# Patient Record
Sex: FEMALE | Race: WHITE | NOT HISPANIC OR LATINO | Employment: UNEMPLOYED | URBAN - METROPOLITAN AREA
[De-identification: names, ages, dates, MRNs, and addresses within clinical notes are randomized per-mention and may not be internally consistent; named-entity substitution may affect disease eponyms.]

---

## 2019-07-23 ENCOUNTER — TELEPHONE (OUTPATIENT)
Dept: PHYSICAL MEDICINE AND REHAB | Facility: CLINIC | Age: 54
End: 2019-07-23

## 2019-07-23 NOTE — TELEPHONE ENCOUNTER
Informed patient we haven't received an order for the EMG.  She states she will call them to send it over again.  Fax number has been verified.

## 2019-07-23 NOTE — TELEPHONE ENCOUNTER
----- Message from Dain Witt sent at 7/23/2019 12:00 PM CDT -----  Type: Needs Medical Advice    Who Called:  Patient  Symptoms (please be specific):  Numbness in hand, arm and shoulder on right side  How long has patient had these symptoms:      Best Call Back Number: [phone]  Additional Information: Patient states that she would like a callback regarding scheduling a nerve conduction test.    Patient states that she has no insurance but is currently with [facility] financial assistance.  [facility] has faxed the orders.

## 2019-07-24 DIAGNOSIS — G62.9 NEUROPATHY: Primary | ICD-10-CM

## 2019-08-20 ENCOUNTER — PROCEDURE VISIT (OUTPATIENT)
Dept: PHYSICAL MEDICINE AND REHAB | Facility: CLINIC | Age: 54
End: 2019-08-20

## 2019-08-20 DIAGNOSIS — G62.9 NEUROPATHY: ICD-10-CM

## 2019-08-20 PROCEDURE — 95886 MUSC TEST DONE W/N TEST COMP: CPT | Mod: PBBFAC,PN | Performed by: PHYSICAL MEDICINE & REHABILITATION

## 2019-08-20 PROCEDURE — 95886 MUSC TEST DONE W/N TEST COMP: CPT | Mod: 26,S$PBB,, | Performed by: PHYSICAL MEDICINE & REHABILITATION

## 2019-08-20 PROCEDURE — 95908 PR NERVE CONDUCTION STUDY; 3-4 STUDIES: ICD-10-PCS | Mod: 26,S$PBB,, | Performed by: PHYSICAL MEDICINE & REHABILITATION

## 2019-08-20 PROCEDURE — 95908 NRV CNDJ TST 3-4 STUDIES: CPT | Mod: PBBFAC,PN | Performed by: PHYSICAL MEDICINE & REHABILITATION

## 2019-08-20 PROCEDURE — 95908 NRV CNDJ TST 3-4 STUDIES: CPT | Mod: 26,S$PBB,, | Performed by: PHYSICAL MEDICINE & REHABILITATION

## 2019-08-20 PROCEDURE — 95886 PR EMG COMPLETE, W/ NERVE CONDUCTION STUDIES, 5+ MUSCLES: ICD-10-PCS | Mod: 26,S$PBB,, | Performed by: PHYSICAL MEDICINE & REHABILITATION

## 2019-08-20 NOTE — PROCEDURES
Procedures   [facility]  Fariha Mitchell D.O.  Physical Medicine and Rehab  Phone: [phone]  Fax: [phone]    Neurography & Electromyography Report              Patient: Amanda Wood   Patient ID: [id number]   Sex:     Date of Birth:     Age:     Notes:     Last visit date: 8/20/2019             Visit date and time: 8/20/2019 07:29   Patient Age on Visit Date:     Referring Physician:     Diagnoses:        Right hand numbness       Sensory NCS      Nerve / Sites Rec. Site Onset Lat Peak Lat Ref. NP Amp Ref. PP Amp Ref. Segments Distance Velocity     ms ms ms µV µV µV µV  cm m/s   R Median - Digit II (Antidromic)      Wrist Dig II 3.70 4.95 ?3.40 6.3 ?15.0 7.8 ?20.0 Wrist - Dig II 13 35   R Ulnar - Digit V (Antidromic)      Wrist Dig V 2.24 2.92 ?3.10 16.8 ?10.0 31.1 ?15.0 Wrist - Dig V 11 49           Motor NCS      Nerve / Sites Muscle Latency Ref. Amplitude Ref. Amp % Duration Segments Distance Lat Diff Velocity Ref.     ms ms mV mV % ms  cm ms m/s m/s   R Median - APB      Wrist APB 5.31 ?4.40 4.9 ?4.0 100 7.76 Wrist - APB 7         Elbow APB 9.84  5.1  105 7.55 Elbow - Wrist 21 4.53 46 ?49   R Ulnar - ADM      Wrist ADM 2.29 ?3.60 8.5 ?5.0 100 6.98 Wrist - ADM 7         B.Elbow ADM 5.78  9.3  110 6.88 B.Elbow - Wrist 21 3.49 60 ?49      A.Elbow ADM 7.66  9.0  106 6.98 A.Elbow - B.Elbow 10 1.88 53 ?49           EMG Summary Table     Spontaneous MUAP Recruitment   Muscle IA Fib PSW Fasc H.F. Amp Dur. PPP Pattern   R. Triceps brachii N None None None None N N N N   R. Deltoid N None None None None N N N N   R. Biceps brachii N None None None None N N N N   R. Extensor carpi radialis brevis N None None None None N N N N   R. First dorsal interosseous N None None None None N N N N   R. Cervical paraspinals (mid) N None None None None N N N N   R. Cervical paraspinals (low) N None None None None N N N N           Summary    The motor conduction test was performed on 2 nerve(s). The results were  normal in 1 nerve(s): R Ulnar - ADM. Results outside the specified normal range were found in 1 nerve(s), as follows:   In the R Median - APB study  o the take off latency result was increased for Wrist stimulation  o the take off velocity result was reduced for Elbow - Wrist segment    The sensory conduction test was performed on 2 nerve(s). The results were normal in 1 nerve(s): R Ulnar - Digit V (Antidromic). Results outside the specified normal range were found in 1 nerve(s), as follows:   In the R Median - Digit II (Antidromic) study  o the peak latency result was increased for Wrist stimulation  o the peak amplitude result was reduced for Wrist stimulation    The needle EMG study was normal in all 7 tested muscles: R. Triceps brachii, R. Deltoid, R. Biceps brachii, R. Extensor carpi radialis brevis, R. First dorsal interosseous, R. Cervical paraspinals (mid), R. Cervical paraspinals (low).              Conclusion:     Moderate to severe right carpal tunnel syndrome          ____________________________  Fariha Mitchell D.O.

## 2019-09-09 ENCOUNTER — TELEPHONE (OUTPATIENT)
Dept: ORTHOPEDICS | Facility: CLINIC | Age: 54
End: 2019-09-09

## 2019-09-09 NOTE — TELEPHONE ENCOUNTER
Called patient to schedule referred appointment from LC Lamb at [facility] with Dr. Simmons for treatment of chronic right carpal tunnel syndrome. No answer at phone number [phone] and left voicemail for patient to call office to schedule an appointment with Dr. Simmons.

## 2019-09-10 ENCOUNTER — TELEPHONE (OUTPATIENT)
Dept: ORTHOPEDICS | Facility: CLINIC | Age: 54
End: 2019-09-10

## 2019-09-10 NOTE — TELEPHONE ENCOUNTER
----- Message from Navya Cancino sent at 9/10/2019  1:53 PM CDT -----  Contact: patient  Type: Needs Medical Advice    Who Called:  Patient   Symptoms (please be specific):  na  How long has patient had these symptoms:  cuong  Pharmacy name and phone #:  [facility]  Best Call Back Number: [phone]  Additional Information: Patient is in need of apt. For carpel tunnel syndrome.  Please call to advise and schedule. thanks!

## 2019-09-10 NOTE — TELEPHONE ENCOUNTER
Returned the patient's call to schedule an appointment for bilateral carpal tunnel. The patient was referred by LC Trejo at [facility]. The patient is scheduled to see us in October at the [facility]. She verbalized time, date, and location of appointment.

## 2019-10-01 ENCOUNTER — TELEPHONE (OUTPATIENT)
Dept: ORTHOPEDICS | Facility: CLINIC | Age: 54
End: 2019-10-01

## 2019-10-01 DIAGNOSIS — M25.532 BILATERAL WRIST PAIN: Primary | ICD-10-CM

## 2019-10-01 DIAGNOSIS — M25.531 BILATERAL WRIST PAIN: Primary | ICD-10-CM

## 2019-10-09 ENCOUNTER — HOSPITAL ENCOUNTER (OUTPATIENT)
Dept: RADIOLOGY | Facility: HOSPITAL | Age: 54
Discharge: HOME | End: 2019-10-09
Attending: ORTHOPAEDIC SURGERY

## 2019-10-09 ENCOUNTER — OFFICE VISIT (OUTPATIENT)
Dept: ORTHOPEDICS | Facility: CLINIC | Age: 54
End: 2019-10-09

## 2019-10-09 VITALS
HEIGHT: 63 IN | HEART RATE: 86 BPM | RESPIRATION RATE: 18 BRPM | BODY MASS INDEX: 23.92 KG/M2 | WEIGHT: 135 LBS | DIASTOLIC BLOOD PRESSURE: 80 MMHG | SYSTOLIC BLOOD PRESSURE: 116 MMHG

## 2019-10-09 DIAGNOSIS — G56.03 BILATERAL CARPAL TUNNEL SYNDROME: ICD-10-CM

## 2019-10-09 DIAGNOSIS — Z01.818 PRE-OP EXAM: Primary | ICD-10-CM

## 2019-10-09 DIAGNOSIS — M18.0 ARTHRITIS OF CARPOMETACARPAL (CMC) JOINT OF BOTH THUMBS: Primary | ICD-10-CM

## 2019-10-09 DIAGNOSIS — G56.21 CUBITAL TUNNEL SYNDROME ON RIGHT: ICD-10-CM

## 2019-10-09 DIAGNOSIS — M25.531 BILATERAL WRIST PAIN: ICD-10-CM

## 2019-10-09 DIAGNOSIS — G56.01 CARPAL TUNNEL SYNDROME OF RIGHT WRIST: Primary | ICD-10-CM

## 2019-10-09 DIAGNOSIS — M25.532 BILATERAL WRIST PAIN: ICD-10-CM

## 2019-10-09 DIAGNOSIS — G56.03 CARPAL TUNNEL SYNDROME, BILATERAL: ICD-10-CM

## 2019-10-09 DIAGNOSIS — G56.23 CUBITAL TUNNEL SYNDROME OF BOTH UPPER EXTREMITIES: ICD-10-CM

## 2019-10-09 PROCEDURE — 99213 OFFICE O/P EST LOW 20 MIN: CPT | Mod: PBBFAC,25,PN | Performed by: ORTHOPAEDIC SURGERY

## 2019-10-09 PROCEDURE — 73110 XR WRIST COMPLETE 3 VIEWS BILATERAL: ICD-10-PCS | Mod: 26,50,, | Performed by: RADIOLOGY

## 2019-10-09 PROCEDURE — 99204 OFFICE O/P NEW MOD 45 MIN: CPT | Mod: 57,S$PBB,, | Performed by: ORTHOPAEDIC SURGERY

## 2019-10-09 PROCEDURE — 99999 PR PBB SHADOW E&M-EST. PATIENT-LVL III: CPT | Mod: PBBFAC,,, | Performed by: ORTHOPAEDIC SURGERY

## 2019-10-09 PROCEDURE — 99204 PR OFFICE/OUTPT VISIT, NEW, LEVL IV, 45-59 MIN: ICD-10-PCS | Mod: 57,S$PBB,, | Performed by: ORTHOPAEDIC SURGERY

## 2019-10-09 PROCEDURE — 99999 PR PBB SHADOW E&M-EST. PATIENT-LVL III: ICD-10-PCS | Mod: PBBFAC,,, | Performed by: ORTHOPAEDIC SURGERY

## 2019-10-09 PROCEDURE — 73110 X-RAY EXAM OF WRIST: CPT | Mod: 26,50,, | Performed by: RADIOLOGY

## 2019-10-09 PROCEDURE — 73110 X-RAY EXAM OF WRIST: CPT | Mod: 50,TC,PN

## 2019-10-09 RX ORDER — MUPIROCIN 20 MG/G
OINTMENT TOPICAL
Status: CANCELLED | OUTPATIENT
Start: 2019-10-09

## 2019-10-09 RX ORDER — SODIUM CHLORIDE 9 MG/ML
INJECTION, SOLUTION INTRAVENOUS CONTINUOUS
Status: CANCELLED | OUTPATIENT
Start: 2019-10-09

## 2019-10-09 RX ORDER — LEVOTHYROXINE SODIUM 137 UG/1
TABLET ORAL
COMMUNITY
Start: 2019-09-30

## 2019-10-09 NOTE — LETTER
October 9, 2019      Rosa Faria, [address]  [place] [address]           [facility]  [address] [place] [address], [address]  [place] [address]  Phone: [phone]  Fax: [phone]          Patient: Amanda Wood   MR Number: [id number]   YOB: 1965   Date of Visit: 10/9/2019       Dear Dr. Rosa Faria:    Thank you for referring Amanda Wood to me for evaluation. Attached you will find relevant portions of my assessment and plan of care.    If you have questions, please do not hesitate to call me. I look forward to following Amanda Wood along with you.    Sincerely,    Kiet Simmons, DO    Enclosure  CC:  No Recipients    If you would like to receive this communication electronically, please contact [email or web address] or [phone] to request more information on [employer] access.    For providers and/or their staff who would like to refer a patient to [facility], please contact us through our one-stop-shop provider referral line, [facility] , at [phone].    If you feel you have received this communication in error or would no longer like to receive these types of communications, please e-mail [email or web address]

## 2019-10-09 NOTE — H&P (VIEW-ONLY)
"Chief Complaint: Pain of the Right Hand and Pain of the Left Hand    HPI:  Ms. Wood is a 54-year-old right-hand-dominant lady who presented today for evaluation of numbness and tingling in both her hands with her right hand most affected and only mild symptoms of her left.  She stated her symptoms have been going on for approximately 8-9 months and they awaken her at night.  She stated, I can't write 2 paragraphs without symptoms." Based to grooming increases her symptoms while nothing seems to improve them. She has been dropping items.  All the fingers of her right hand her affected and primarily ring and small finger on the left hand.  She has taken NSAIDs and worn a brace without help.  She has not done physical therapy nor had injections.    Past Medical History:   Diagnosis Date    Hypoglycemia      *  *  *  *  *  *  * Thyroid disease  Seasonal allergies  Depression  Anxiety  Headaches  Seizures  PTSD  History of suicide attempt      Past Surgical History:   Procedure Laterality Date    KNEE SURGERY patella repair left      LAPAROSCOPIC CHOLECYSTECTOMY      MOLE REMOVAL      THYROIDECTOMY Bilateral     TONSILLECTOMY      FLEXOR TENDON REPAIR LEFT WRIST after suicide attempt      PRK BILATERAL EYES         Review of patient's allergies indicates:  No Known Allergies    Social History     Occupational History    Currently unemployed   Tobacco Use    Smoking status: Current Every Day Smoker 1 pack per day for 43 years    Smokeless tobacco: Never Used   Substance and Sexual Activity    Alcohol use: Yes    Drug use: Smokes marijuana    Sexual activity: Not Currently      Family History   Problem Relation Age of Onset    Cancer Mother     Parkinsonism Father     Heart attack Father     Irritable bowel syndrome Sister     Parkinsonism Paternal Uncle        Previous Hospitalizations:  Suicide attempt, patella reconstruction, kidney infection.    ROS:   Review of Systems   Constitution: Negative for " chills and fever.   HENT: Negative for congestion.    Eyes: Negative for blurred vision.   Cardiovascular: Negative for chest pain.   Respiratory: Negative for cough.    Endocrine: Negative for polydipsia.   Hematologic/Lymphatic: Negative for adenopathy.   Skin: Negative for flushing, itching and skin cancer.   Musculoskeletal: Negative for gout.   Gastrointestinal: Negative for constipation, diarrhea and heartburn.   Genitourinary: Negative for nocturia.   Neurological: Positive for headaches and seizures.   Psychiatric/Behavioral: Positive for depression and substance abuse. The patient is nervous/anxious.    Allergic/Immunologic: Positive for environmental allergies.           Objective:      Physical Exam:   General: AAOx3.  No acute distress  HEENT: Normocephalic, PEARLA EOMI, poor dentition  Neck: Supple, No JVD  Chest: Symetric, equal excursion on inspiration  Abdomen: Soft NTND  Vascular:  Pulses intact and equal bilaterally.  Capillary refill less than 3 seconds and equal bilaterally  Neurologic:  Pinprick and soft touch intact and equal bilaterally. Tinel's positive both wrists and both elbows.  Elbow flexion test positive bilaterally.  Phalen's test positive bilaterally. Spurling's negative.  Integment:  No ecchymosis, no errythema.  Multiple tattoos. cicatrix volar aspect left wrist  Extremity:  Wrist:  Pronation/supination equal bilaterally 85/85 degrees. Dorsiflexion/volar flexion equal bilaterally 80/75 degrees. Nontender in the anatomic snuffbox bilaterally. Nontender at the 1st dorsal compartment bilaterally.  Finkelstein's negative bilaterally. No swelling at the 1st dorsal compartment bilaterally. Nontender at the scapholunate interval bilaterally. Brizuela's test negative bilaterally. Nontender at the DRUJ/TFCC bilaterally.  Nontender with forced ulnar deviation bilaterally. Positive thenar and hypothenar atrophy both hands left greater than right.  Wartenberg sign negative bilaterally. Strength  equal with  both hands.  Durkan's test positive both wrists right greater than left.  Grind test negative both hands.                       Elbow:  Pronation/supination equal bilaterally 85/85 degrees. Extension/flexion equal bilaterally 0/128 degrees. Radial/ulna stressing equal bilaterally with endpoint.  Nontender over the medial or lateral epicondyles.  Nontender over the radial head.  No crepitus with motion radial head.  Nontender over the olecranon insertion of the triceps.  Triceps palpable throughout full distribution.                        C-spine:  Forward flexion/backward flexion 70/70 degrees. Right/left rotation 70/70 degrees. Right/left side bending 45/45 degrees. No increased symptoms with neck motion.  Relatively nontender with neck motion.  Nontender with and C-spine palpation.  Radiography:  Personally reviewed x-rays of both hands completed on 10/09/2019 showed 1st CMC arthritis bilateral hands.  Electrodiagnostic studies:  Nerve conduction study completed on the right upper extremity was consistent with moderate to severe right carpal tunnel syndrome.      Assessment:       Impression:      1. Bilateral carpal tunnel syndrome    2.  3. Clinical cubital tunnel syndrome of both upper extremities   First CMC arthritis both hands.         Plan:       1.  Discussed physical examination and radiographic findings with the patient. Amanda understands that she has carpal tunnel and cubital tunnel syndrome both of her upper extremities.  She could either continue with conservative management or proceed with surgery. She stated that she has failed NSAIDs and bracing and cannot afford physical therapy would prefer to proceed with surgery as she needs to try to get back to work.  Since her right upper extremity is most affected she would prefer to proceed with a right upper extremity 1st.  2.  Possible complications of surgery to include bleeding, infection, scarring, nerve/blood vessel/tendon damage,  need for further surgery, failed surgery, failure to improve, possible persistent pain, possible recurrence, possible failure to improve, and possible stiffness were discussed with the patient.  She was permitted to ask questions and all concerns were addressed to her satisfaction.  3.  Consent for endoscopic versus open right carpal tunnel release and ulnar nerve decompression right elbow.  4.  Tentatively schedule surgery for 10/15/2019.  5.  All postoperative meds will be given on the date of surgery.  6.  Home exercises to include vitamin-E massages to the carpal canal which shown discussed with the patient.  7.  Continue with cock-up wrist splint as previously prescribed.  8.  Continue with NSAIDs as tolerated allowed by PCM.  9.  [facility] portal was discussed with the patient and information was given.  The patient was encouraged to use the portal for future encounters.  10.  Follow up approximately 10-12 days postoperatively.

## 2019-10-09 NOTE — PROGRESS NOTES
"  Subjective:      Patient ID: Amanda Wood is a 54 y.o. female.    Chief Complaint: Pain of the Right Hand and Pain of the Left Hand    Referring Provider: Rosa Faria Np  [address] [place] [address] [place] [address]    HPI:  Ms. Wood is a 54-year-old right-hand-dominant lady who presented today for evaluation of numbness and tingling in both her hands with her right hand most affected and only mild symptoms of her left.  She stated her symptoms have been going on for approximately 8-9 months and they awaken her at night.  She stated, I can't write 2 paragraphs without symptoms." Based to grooming increases her symptoms while nothing seems to improve them. She has been dropping items.  All the fingers of her right hand her affected and primarily ring and small finger on the left hand.  She has taken NSAIDs and worn a brace without help.  She has not done physical therapy nor had injections.    Past Medical History:   Diagnosis Date    Hypoglycemia      *  *  *  *  *  *  * Thyroid disease  Seasonal allergies  Depression  Anxiety  Headaches  Seizures  PTSD  History of suicide attempt      Past Surgical History:   Procedure Laterality Date    KNEE SURGERY patella repair left      LAPAROSCOPIC CHOLECYSTECTOMY      MOLE REMOVAL      THYROIDECTOMY Bilateral     TONSILLECTOMY      FLEXOR TENDON REPAIR LEFT WRIST after suicide attempt      PRK BILATERAL EYES         Review of patient's allergies indicates:  No Known Allergies    Social History     Occupational History    Currently unemployed   Tobacco Use    Smoking status: Current Every Day Smoker 1 pack per day for 43 years    Smokeless tobacco: Never Used   Substance and Sexual Activity    Alcohol use: Yes    Drug use: Smokes marijuana    Sexual activity: Not Currently      Family History   Problem Relation Age of Onset    Cancer Mother     Parkinsonism Father     Heart attack Father     Irritable bowel syndrome Sister     " Parkinsonism Paternal Uncle        Previous Hospitalizations:  Suicide attempt, patella reconstruction, kidney infection.    ROS:   Review of Systems   Constitution: Negative for chills and fever.   HENT: Negative for congestion.    Eyes: Negative for blurred vision.   Cardiovascular: Negative for chest pain.   Respiratory: Negative for cough.    Endocrine: Negative for polydipsia.   Hematologic/Lymphatic: Negative for adenopathy.   Skin: Negative for flushing, itching and skin cancer.   Musculoskeletal: Negative for gout.   Gastrointestinal: Negative for constipation, diarrhea and heartburn.   Genitourinary: Negative for nocturia.   Neurological: Positive for headaches and seizures.   Psychiatric/Behavioral: Positive for depression and substance abuse. The patient is nervous/anxious.    Allergic/Immunologic: Positive for environmental allergies.           Objective:      Physical Exam:   General: AAOx3.  No acute distress  HEENT: Normocephalic, PEARLA EOMI, poor dentition  Neck: Supple, No JVD  Chest: Symetric, equal excursion on inspiration  Abdomen: Soft NTND  Vascular:  Pulses intact and equal bilaterally.  Capillary refill less than 3 seconds and equal bilaterally  Neurologic:  Pinprick and soft touch intact and equal bilaterally. Tinel's positive both wrists and both elbows.  Elbow flexion test positive bilaterally.  Phalen's test positive bilaterally. Spurling's negative.  Integment:  No ecchymosis, no errythema.  Multiple tattoos. cicatrix volar aspect left wrist  Extremity:  Wrist:  Pronation/supination equal bilaterally 85/85 degrees. Dorsiflexion/volar flexion equal bilaterally 80/75 degrees. Nontender in the anatomic snuffbox bilaterally. Nontender at the 1st dorsal compartment bilaterally.  Finkelstein's negative bilaterally. No swelling at the 1st dorsal compartment bilaterally. Nontender at the scapholunate interval bilaterally. Brizuela's test negative bilaterally. Nontender at the DRUJ/TFCC  bilaterally.  Nontender with forced ulnar deviation bilaterally. Positive thenar and hypothenar atrophy both hands left greater than right.  Wartenberg sign negative bilaterally. Strength equal with  both hands.  Durkan's test positive both wrists right greater than left.  Grind test negative both hands.                       Elbow:  Pronation/supination equal bilaterally 85/85 degrees. Extension/flexion equal bilaterally 0/128 degrees. Radial/ulna stressing equal bilaterally with endpoint.  Nontender over the medial or lateral epicondyles.  Nontender over the radial head.  No crepitus with motion radial head.  Nontender over the olecranon insertion of the triceps.  Triceps palpable throughout full distribution.                        C-spine:  Forward flexion/backward flexion 70/70 degrees. Right/left rotation 70/70 degrees. Right/left side bending 45/45 degrees. No increased symptoms with neck motion.  Relatively nontender with neck motion.  Nontender with and C-spine palpation.  Radiography:  Personally reviewed x-rays of both hands completed on 10/09/2019 showed 1st CMC arthritis bilateral hands.  Electrodiagnostic studies:  Nerve conduction study completed on the right upper extremity was consistent with moderate to severe right carpal tunnel syndrome.      Assessment:       Impression:      1. Bilateral carpal tunnel syndrome    2.  3. Clinical cubital tunnel syndrome of both upper extremities   First CMC arthritis both hands.         Plan:       1.  Discussed physical examination and radiographic findings with the patient. Amanda understands that she has carpal tunnel and cubital tunnel syndrome both of her upper extremities.  She could either continue with conservative management or proceed with surgery. She stated that she has failed NSAIDs and bracing and cannot afford physical therapy would prefer to proceed with surgery as she needs to try to get back to work.  Since her right upper extremity is most  affected she would prefer to proceed with a right upper extremity 1st.  2.  Possible complications of surgery to include bleeding, infection, scarring, nerve/blood vessel/tendon damage, need for further surgery, failed surgery, failure to improve, possible persistent pain, possible recurrence, possible failure to improve, and possible stiffness were discussed with the patient.  She was permitted to ask questions and all concerns were addressed to her satisfaction.  3.  Consent for endoscopic versus open right carpal tunnel release and ulnar nerve decompression right elbow.  4.  Tentatively schedule surgery for 10/15/2019.  5.  All postoperative meds will be given on the date of surgery.  6.  Home exercises to include vitamin-E massages to the carpal canal which shown discussed with the patient.  7.  Continue with cock-up wrist splint as previously prescribed.  8.  Continue with NSAIDs as tolerated allowed by PCM.  9.  [facility] portal was discussed with the patient and information was given.  The patient was encouraged to use the portal for future encounters.  10.  Follow up approximately 10-12 days postoperatively.

## 2019-10-11 ENCOUNTER — HOSPITAL ENCOUNTER (OUTPATIENT)
Dept: PREADMISSION TESTING | Facility: HOSPITAL | Age: 54
Discharge: HOME | End: 2019-10-11
Attending: ORTHOPAEDIC SURGERY

## 2019-10-11 ENCOUNTER — ANESTHESIA EVENT (OUTPATIENT)
Dept: SURGERY | Facility: HOSPITAL | Age: 54
End: 2019-10-11

## 2019-10-11 VITALS — BODY MASS INDEX: 23.04 KG/M2 | HEIGHT: 63 IN | WEIGHT: 130 LBS

## 2019-10-11 PROCEDURE — 99900103 DSU ONLY-NO CHARGE-INITIAL HR (STAT)

## 2019-10-11 NOTE — ANESTHESIA PREPROCEDURE EVALUATION
10/11/2019  Amanda Wood is a 54 y.o., female.    Pre-op Assessment    I have reviewed the Patient Summary Reports.    I have reviewed the Nursing Notes.   I have reviewed the Medications.     Review of Systems  Anesthesia Hx:  No problems with previous Anesthesia  Neg history of prior surgery. Denies Family Hx of Anesthesia complications.   Denies Personal Hx of Anesthesia complications.   Social:  Smoker    Hematology/Oncology:  Hematology Normal   Oncology Normal     EENT/Dental:EENT/Dental Normal   Cardiovascular:  Cardiovascular Normal     Pulmonary:  Pulmonary Normal    Renal/:  Renal/ Normal     Hepatic/GI:  Hepatic/GI Normal    Musculoskeletal:  Musculoskeletal Normal    Neurological:   Neuromuscular Disease,    Endocrine:   Hypothyroidism    Dermatological:  Skin Normal    Psych:  Psychiatric Normal           Physical Exam   Airway/Jaw/Neck:  Airway Findings: Mouth Opening: Normal Tongue: Normal  General Airway Assessment: Adult  Mallampati: II  TM Distance: 4 - 6 cm       Chest/Lungs:  Chest/Lungs Findings: Clear to auscultation     Heart/Vascular:  Heart Findings: Rate: Normal  Rhythm: Regular Rhythm             Anesthesia Plan  Type of Anesthesia, risks & benefits discussed:  Anesthesia Type:  general  Patient's Preference:   Intra-op Monitoring Plan: standard ASA monitors  Intra-op Monitoring Plan Comments:   Post Op Pain Control Plan:   Post Op Pain Control Plan Comments:   Induction:   IV  Beta Blocker:  Patient is not currently on a Beta-Blocker (No further documentation required).       Informed Consent: Patient understands risks and agrees with Anesthesia plan.  Questions answered. Anesthesia consent signed with patient.  ASA Score: 2     Day of Surgery Review of History & Physical:    H&P update referred to the provider.

## 2019-10-11 NOTE — DISCHARGE INSTRUCTIONS
PRE-OP INSTRUCTIONS                                   1. Someone will call you with the time to arrive the day before your surgery.  2. Check in at the Outpatient Registration desk the day of you surgery.  3. Go to the surgery waiting room after checking in.  4. Do not eat or drink anything after midnight the night before your surgery.  5. Have a ride home after your procedure.  6. Wear clothing big enough to fit over a bulky post-op dressing.  7. Hibiclens as instructed.  8. Do not bring any valuables to the hospital with you.  9. Remove contact lenses, retainers, dentures, partials and ALL jewelry prior to procedure.  10. You need someone to stay with you 24 hours after your procedure/anesthesia.  11. You may not drive or operate heavy machinery 24 hours after you have had anesthesia.  12. Surgery dept. phone number [phone].       HIBICLENS INSTRUCTIONS     You will take two showers with this soap. The first shower should be taken the night before your surgery/procedure and the second shower the morning of surgery/procedure.   Do not shave the area of your body where your surgery will be performed. Any new cut, abrasion or rash on your surgical site will need to be evaluated and may cause a delay in your procedure.   Turn water off before applying the hibiclens soap to prevent rinsing it off too soon. Apply the soap to your entire body from the jaw down, using a clean washcloth or your hands. Do not use hibiclens near your eyes, ears, nose or mouth.   Wash thoroughly for three minutes, paying special attention to the area where your surgery will be performed. Do not scrub your skin too hard. Do not wash with your regular soap after using the hibiclens. Turn the water back on and rinse your body well.   Pat yourself dry with a fresh, clean, soft towel after each shower. Put on clean clothes or pajamas. Use freshly laundered bed linens for the first night.   Do not apply  any lotions, perfumes or powders after use.

## 2019-10-15 ENCOUNTER — ANESTHESIA (OUTPATIENT)
Dept: SURGERY | Facility: HOSPITAL | Age: 54
End: 2019-10-15

## 2019-10-15 ENCOUNTER — HOSPITAL ENCOUNTER (OUTPATIENT)
Facility: HOSPITAL | Age: 54
Discharge: HOME | End: 2019-10-15
Attending: ORTHOPAEDIC SURGERY | Admitting: ORTHOPAEDIC SURGERY

## 2019-10-15 VITALS
HEIGHT: 63 IN | SYSTOLIC BLOOD PRESSURE: 126 MMHG | BODY MASS INDEX: 23.92 KG/M2 | WEIGHT: 135 LBS | RESPIRATION RATE: 17 BRPM | DIASTOLIC BLOOD PRESSURE: 82 MMHG | OXYGEN SATURATION: 95 % | TEMPERATURE: 98 F | HEART RATE: 89 BPM

## 2019-10-15 DIAGNOSIS — G56.21 CUBITAL TUNNEL SYNDROME ON RIGHT: ICD-10-CM

## 2019-10-15 DIAGNOSIS — G56.01 CARPAL TUNNEL SYNDROME OF RIGHT WRIST: Primary | ICD-10-CM

## 2019-10-15 PROCEDURE — 36000707: Performed by: ORTHOPAEDIC SURGERY

## 2019-10-15 PROCEDURE — 64718 PR REVISE ULNAR NERVE AT ELBOW: ICD-10-PCS | Mod: RT,,, | Performed by: ORTHOPAEDIC SURGERY

## 2019-10-15 PROCEDURE — S0028 INJECTION, FAMOTIDINE, 20 MG: HCPCS

## 2019-10-15 PROCEDURE — 63600175 PHARM REV CODE 636 W HCPCS: Performed by: NURSE ANESTHETIST, CERTIFIED REGISTERED

## 2019-10-15 PROCEDURE — 29848 WRIST ENDOSCOPY/SURGERY: CPT | Mod: 51,RT,, | Performed by: ORTHOPAEDIC SURGERY

## 2019-10-15 PROCEDURE — 27201423 OPTIME MED/SURG SUP & DEVICES STERILE SUPPLY: Performed by: ORTHOPAEDIC SURGERY

## 2019-10-15 PROCEDURE — 37000009 HC ANESTHESIA EA ADD 15 MINS: Performed by: ORTHOPAEDIC SURGERY

## 2019-10-15 PROCEDURE — 37000008 HC ANESTHESIA 1ST 15 MINUTES: Performed by: ORTHOPAEDIC SURGERY

## 2019-10-15 PROCEDURE — 25000003 PHARM REV CODE 250

## 2019-10-15 PROCEDURE — 25000003 PHARM REV CODE 250: Performed by: ANESTHESIOLOGY

## 2019-10-15 PROCEDURE — 71000033 HC RECOVERY, INTIAL HOUR: Performed by: ORTHOPAEDIC SURGERY

## 2019-10-15 PROCEDURE — 25000003 PHARM REV CODE 250: Performed by: ORTHOPAEDIC SURGERY

## 2019-10-15 PROCEDURE — 71000015 HC POSTOP RECOV 1ST HR: Performed by: ORTHOPAEDIC SURGERY

## 2019-10-15 PROCEDURE — 36000706: Performed by: ORTHOPAEDIC SURGERY

## 2019-10-15 PROCEDURE — D9220A PRA ANESTHESIA: Mod: ,,, | Performed by: ANESTHESIOLOGY

## 2019-10-15 PROCEDURE — 29848 PR WRIST ARTHROSCOP,RELEASE XVERS LIG: ICD-10-PCS | Mod: 51,RT,, | Performed by: ORTHOPAEDIC SURGERY

## 2019-10-15 PROCEDURE — 63600175 PHARM REV CODE 636 W HCPCS

## 2019-10-15 PROCEDURE — D9220A PRA ANESTHESIA: ICD-10-PCS | Mod: ,,, | Performed by: ANESTHESIOLOGY

## 2019-10-15 PROCEDURE — 25000003 PHARM REV CODE 250: Performed by: NURSE ANESTHETIST, CERTIFIED REGISTERED

## 2019-10-15 PROCEDURE — 63600175 PHARM REV CODE 636 W HCPCS: Performed by: ANESTHESIOLOGY

## 2019-10-15 PROCEDURE — 64718 REVISE ULNAR NERVE AT ELBOW: CPT | Mod: RT,,, | Performed by: ORTHOPAEDIC SURGERY

## 2019-10-15 RX ORDER — ONDANSETRON 2 MG/ML
INJECTION INTRAMUSCULAR; INTRAVENOUS
Status: DISCONTINUED | OUTPATIENT
Start: 2019-10-15 | End: 2019-10-15

## 2019-10-15 RX ORDER — SODIUM CHLORIDE, SODIUM LACTATE, POTASSIUM CHLORIDE, CALCIUM CHLORIDE 600; 310; 30; 20 MG/100ML; MG/100ML; MG/100ML; MG/100ML
125 INJECTION, SOLUTION INTRAVENOUS CONTINUOUS
Status: DISCONTINUED | OUTPATIENT
Start: 2019-10-15 | End: 2019-10-15 | Stop reason: HOSPADM

## 2019-10-15 RX ORDER — SODIUM CHLORIDE, SODIUM LACTATE, POTASSIUM CHLORIDE, CALCIUM CHLORIDE 600; 310; 30; 20 MG/100ML; MG/100ML; MG/100ML; MG/100ML
INJECTION, SOLUTION INTRAVENOUS CONTINUOUS
Status: DISCONTINUED | OUTPATIENT
Start: 2019-10-15 | End: 2019-10-15 | Stop reason: HOSPADM

## 2019-10-15 RX ORDER — LIDOCAINE HYDROCHLORIDE 10 MG/ML
1 INJECTION, SOLUTION EPIDURAL; INFILTRATION; INTRACAUDAL; PERINEURAL ONCE
Status: DISCONTINUED | OUTPATIENT
Start: 2019-10-15 | End: 2019-10-15 | Stop reason: HOSPADM

## 2019-10-15 RX ORDER — SODIUM CHLORIDE, SODIUM LACTATE, POTASSIUM CHLORIDE, CALCIUM CHLORIDE 600; 310; 30; 20 MG/100ML; MG/100ML; MG/100ML; MG/100ML
INJECTION, SOLUTION INTRAVENOUS CONTINUOUS PRN
Status: DISCONTINUED | OUTPATIENT
Start: 2019-10-15 | End: 2019-10-15

## 2019-10-15 RX ORDER — MEPERIDINE HYDROCHLORIDE 50 MG/ML
INJECTION INTRAMUSCULAR; INTRAVENOUS; SUBCUTANEOUS
Status: DISCONTINUED | OUTPATIENT
Start: 2019-10-15 | End: 2019-10-15

## 2019-10-15 RX ORDER — FAMOTIDINE 10 MG/ML
INJECTION INTRAVENOUS
Status: COMPLETED
Start: 2019-10-15 | End: 2019-10-15

## 2019-10-15 RX ORDER — HYDROCODONE BITARTRATE AND ACETAMINOPHEN 5; 325 MG/1; MG/1
1 TABLET ORAL ONCE
Status: COMPLETED | OUTPATIENT
Start: 2019-10-15 | End: 2019-10-15

## 2019-10-15 RX ORDER — HYDROCODONE BITARTRATE AND ACETAMINOPHEN 5; 325 MG/1; MG/1
TABLET ORAL
Status: COMPLETED
Start: 2019-10-15 | End: 2019-10-15

## 2019-10-15 RX ORDER — ONDANSETRON 2 MG/ML
4 INJECTION INTRAMUSCULAR; INTRAVENOUS DAILY PRN
Status: DISCONTINUED | OUTPATIENT
Start: 2019-10-15 | End: 2019-10-15 | Stop reason: HOSPADM

## 2019-10-15 RX ORDER — MORPHINE SULFATE 4 MG/ML
2 INJECTION, SOLUTION INTRAMUSCULAR; INTRAVENOUS EVERY 5 MIN PRN
Status: DISCONTINUED | OUTPATIENT
Start: 2019-10-15 | End: 2019-10-15 | Stop reason: HOSPADM

## 2019-10-15 RX ORDER — DIPHENHYDRAMINE HYDROCHLORIDE 50 MG/ML
12.5 INJECTION INTRAMUSCULAR; INTRAVENOUS
Status: DISCONTINUED | OUTPATIENT
Start: 2019-10-15 | End: 2019-10-15 | Stop reason: HOSPADM

## 2019-10-15 RX ORDER — PROPOFOL 10 MG/ML
VIAL (ML) INTRAVENOUS
Status: DISCONTINUED | OUTPATIENT
Start: 2019-10-15 | End: 2019-10-15

## 2019-10-15 RX ORDER — GLYCOPYRROLATE 0.2 MG/ML
INJECTION INTRAMUSCULAR; INTRAVENOUS
Status: DISCONTINUED | OUTPATIENT
Start: 2019-10-15 | End: 2019-10-15

## 2019-10-15 RX ORDER — MORPHINE SULFATE 4 MG/ML
INJECTION, SOLUTION INTRAMUSCULAR; INTRAVENOUS
Status: COMPLETED
Start: 2019-10-15 | End: 2019-10-15

## 2019-10-15 RX ORDER — MIDAZOLAM HYDROCHLORIDE 1 MG/ML
INJECTION, SOLUTION INTRAMUSCULAR; INTRAVENOUS
Status: DISCONTINUED | OUTPATIENT
Start: 2019-10-15 | End: 2019-10-15

## 2019-10-15 RX ORDER — FAMOTIDINE 10 MG/ML
20 INJECTION INTRAVENOUS 2 TIMES DAILY
Status: DISCONTINUED | OUTPATIENT
Start: 2019-10-15 | End: 2019-10-15 | Stop reason: HOSPADM

## 2019-10-15 RX ORDER — SODIUM CHLORIDE 9 MG/ML
INJECTION, SOLUTION INTRAVENOUS CONTINUOUS
Status: DISCONTINUED | OUTPATIENT
Start: 2019-10-15 | End: 2019-10-15 | Stop reason: HOSPADM

## 2019-10-15 RX ORDER — MUPIROCIN 20 MG/G
OINTMENT TOPICAL
Status: DISCONTINUED | OUTPATIENT
Start: 2019-10-15 | End: 2019-10-15 | Stop reason: HOSPADM

## 2019-10-15 RX ADMIN — MORPHINE SULFATE 2 MG: 4 INJECTION, SOLUTION INTRAMUSCULAR; INTRAVENOUS at 01:10

## 2019-10-15 RX ADMIN — HYDROCODONE BITARTRATE AND ACETAMINOPHEN 1 TABLET: 5; 325 TABLET ORAL at 01:10

## 2019-10-15 RX ADMIN — PROPOFOL 200 MG: 10 INJECTION, EMULSION INTRAVENOUS at 11:10

## 2019-10-15 RX ADMIN — MEPERIDINE HYDROCHLORIDE 50 MG: 50 INJECTION INTRAMUSCULAR; INTRAVENOUS; SUBCUTANEOUS at 11:10

## 2019-10-15 RX ADMIN — FAMOTIDINE 20 MG: 10 INJECTION, SOLUTION INTRAVENOUS at 10:10

## 2019-10-15 RX ADMIN — MUPIROCIN: 20 OINTMENT TOPICAL at 10:10

## 2019-10-15 RX ADMIN — ONDANSETRON 4 MG: 2 INJECTION INTRAMUSCULAR; INTRAVENOUS at 12:10

## 2019-10-15 RX ADMIN — FAMOTIDINE 20 MG: 10 INJECTION INTRAVENOUS at 10:10

## 2019-10-15 RX ADMIN — SODIUM CHLORIDE, POTASSIUM CHLORIDE, SODIUM LACTATE AND CALCIUM CHLORIDE: 600; 310; 30; 20 INJECTION, SOLUTION INTRAVENOUS at 11:10

## 2019-10-15 RX ADMIN — GLYCOPYRROLATE 0.4 MG: 0.2 INJECTION INTRAMUSCULAR; INTRAVENOUS at 12:10

## 2019-10-15 RX ADMIN — ONDANSETRON 4 MG: 2 INJECTION INTRAMUSCULAR; INTRAVENOUS at 02:10

## 2019-10-15 RX ADMIN — MIDAZOLAM HYDROCHLORIDE 2 MG: 1 INJECTION, SOLUTION INTRAMUSCULAR; INTRAVENOUS at 11:10

## 2019-10-15 NOTE — TRANSFER OF CARE
"Anesthesia Transfer of Care Note    Patient: Amanda Wood    Procedure(s) Performed: Procedure(s) (LRB):  RELEASE, CARPAL TUNNEL (Right)  RELEASE, CARPAL TUNNEL, ENDOSCOPIC (Right)  DECOMPRESSION, NERVE, ULNAR (Right)  RELEASE, CUBITAL TUNNEL (Right)    Patient location: PACU    Anesthesia Type: general    Transport from OR: Transported from OR on room air with adequate spontaneous ventilation    Post pain: adequate analgesia    Post assessment: no apparent anesthetic complications and tolerated procedure well    Post vital signs: stable    Level of consciousness: awake, alert and oriented    Nausea/Vomiting: no nausea/vomiting    Complications: none    Transfer of care protocol was followed      Last vitals:   Visit Vitals  /74   Pulse 73   Temp 36.9 °C (98.4 °F) (Oral)   Resp 11   Ht 5' 3" (1.6 m)   Wt 61.2 kg (135 lb)   SpO2 100%   Breastfeeding? No   BMI 23.91 kg/m²     "

## 2019-10-15 NOTE — DISCHARGE INSTRUCTIONS
Discharge Instructions for Carpal Tunnel Release  You had a carpal tunnel release procedure to help relieve the symptoms of carpal tunnel syndrome. In carpal tunnel syndrome, a nerve in the wrist is compressed and irritated. This causes numbness and pain in the fingers and hand. Carpal tunnel release relieves the compression of the nerve. Here are instructions that will help you care for your arm and wrist when you are at home.  Home care  · Avoid gripping objects tightly or lifting with your affected arm.  · Wear your bandage, splint, or cast as directed by your doctor.  · Always keep the dressing, splint, or cast dry and clean.  · When showering, cover your hand and  wrist with plastic and use tape or rubberbands to keep the dressing, splint, or cast dry. Shower as necessary.    · Use an ice pack or bag of frozen peas -- or something similar -- wrapped in a thin towel on your wrist to reduce swelling for the first 48 hours. Leave the ice pack on for 20 minutes; then take it off for 20 minutes. Repeat as needed.  · Keep your arm elevated above your heart for 24 to 48 hours after surgery.  · Do the exercises you learned in the hospital, or as instructed by your doctor.  · Take pain medicine as directed.  · Dont drive until your doctor says its OK. Never drive while you are taking opioid pain medicine.  · Ask your doctor when you can return to work. If your job requires heavy lifting, you may not be able to begin working again for several weeks.  Follow-up care  Make a follow-up appointment as directed by your doctor.     When to seek medical care  Call 911 right away if you have any of the following:  · Chest pain  · Shortness of breath  Otherwise, call your doctor immediately if you have any of the following:  · A splint, cast, or dressing that has gotten wet  · Increased bleeding or drainage from the incision (cut)  · Opening of the incision  · Fever above 100.4°F (38.9°C) taken by mouth, or shaking  "chills  · Any new numbness in the fingers or thumb  · Blue hand or fingers  · Increased pain with or without activity  · Increased redness, tenderness, or swelling of the incision         Sling    A sling is designed to support your arm in a position of rest. It is used for injuries of the hand, forearm, upper arm, and shoulder.  A shoulder that is immobilized too long can become stiff and lose range of motion. Follow up with your doctor as advised and do not use the sling longer than directed.    Home use:  · Leave the sling in place as long as directed by your doctor. Unless told otherwise, you may remove it when bathing, dressing, and when you go to sleep.  · If approved by your health care provider, you can do gentle "pendulum exercises." To do these:  ¨ Remove your sling.  ¨ Stand or sit with your arm vertical and close to your side.  ¨ Relax your shoulder muscles and gently swing the arm forward and back, side to side, and in small circles.  ¨ Do this for about 5 minutes once or twice a day.  There should be only minimal pain with this exercise. If you experience more than minimal discomfort, stop and call your health care provider.  · The sling is adjustable. If it becomes loose, adjust it so that your forearm is horizontal (level with the ground). Your hand should be level with the elbow.        "

## 2019-10-15 NOTE — ANESTHESIA POSTPROCEDURE EVALUATION
Anesthesia Post Evaluation    Patient: Amanda Wood    Procedure(s) Performed: Procedure(s) (LRB):  RELEASE, CARPAL TUNNEL (Right)  RELEASE, CARPAL TUNNEL, ENDOSCOPIC (Right)  DECOMPRESSION, NERVE, ULNAR (Right)  RELEASE, CUBITAL TUNNEL (Right)    Final Anesthesia Type: general  Patient location during evaluation: PACU  Patient participation: Yes- Able to Participate  Level of consciousness: awake and alert  Post-procedure vital signs: reviewed and stable  Pain management: adequate  Airway patency: patent  PONV status at discharge: No PONV  Anesthetic complications: no      Cardiovascular status: blood pressure returned to baseline  Respiratory status: unassisted  Hydration status: euvolemic  Follow-up not needed.          Vitals Value Taken Time   /82 10/15/2019  2:17 PM   Temp 36.8 °C (98.3 °F) 10/15/2019  1:18 PM   Pulse 80 10/15/2019  2:27 PM   Resp 19 10/15/2019  2:00 PM   SpO2 93 % 10/15/2019  2:27 PM   Vitals shown include unvalidated device data.      Event Time     Out of Recovery 14:00:00          Pain/Karel Score: Pain Rating Prior to Med Admin: 5 (10/15/2019  1:55 PM)  Pain Rating Post Med Admin: 4 (10/15/2019  1:56 PM)  Karel Score: 10 (10/15/2019  2:13 PM)

## 2019-10-15 NOTE — DISCHARGE SUMMARY
Ms. Wood was admitted through same-day surgery was brought to the operating room where an ulnar nerve decompression and carpal tunnel release was completed.  For full account of surgery please see the operative report.  Postoperatively the patient was transferred from the operating room to the recovery room and when alert awake and oriented was discharged home in stable condition with instructions to follow up in 10-12 days.

## 2019-10-15 NOTE — INTERVAL H&P NOTE
The patient has been examined and the H&P has been reviewed:  Operative exteremity signed at 11:45 hrs.  H&P updated at 11:45 hrts.  Pt reeady to roll to OR at 11:s.    I concur with the findings and no changes have occurred since H&P was written.    Anesthesia/Surgery risks, benefits and alternative options discussed and understood by patient/family.          Active Hospital Problems    Diagnosis  POA    Carpal tunnel syndrome of right wrist [G56.01]  Yes      Resolved Hospital Problems   No resolved problems to display.

## 2019-10-15 NOTE — PLAN OF CARE
Hob at 60 degrees, pt c/o mild nausea. meds given as ordered, water and crackers given, cont to monitor

## 2019-10-15 NOTE — OP NOTE
"[facility]  Orthopedic Surgery    10/15/2019    Amanda Wood  [id number]      PREOPERATIVE DIAGNOSIS:   1.  Carpal tunnel syndrome of right wrist [G56.01]  2.  Cubital tunnel syndrome on right [G56.21]    POSTOPERATIVE DIAGNOSIS:    1.  Right carpal tunnel syndrome.  2.  Right cubital tunnel syndrome.    PROCEDURE:  1.  Endoscopic right carpal tunnel release.  2.  Ulnar nerve decompression right elbow.  3.  Long-arm plaster splint, right upper extremity.    SURGEON: Kiet Simmons D.O.    ASSISTANT: N/A.    ANESTHESIA:  General.    BLOOD LOSS:  Less than 20 cc.    TOURNIQUET:  16 min.    DRAINS:  None.    PATHOLOGY:  None.    COMPLICATION:  None.    INDICATIONS FOR PROCEDURE:   Ms. Wood is a 54-year-old right-hand-dominant lady who presented today for evaluation of numbness and tingling in both her hands with her right hand most affected and only mild symptoms of her left. Her symptoms have been going on for approximately 8-9 months and they awaken her at night.  She stated, I can't write 2 paragraphs without symptoms."  She has been dropping items.  All the fingers of her right hand are affected and primarily ring and small finger on the left hand.  She has taken NSAIDs and worn a brace without help.  She elected to proceed with surgery after she failed conservative care and complications to include bleeding, infection, scarring, nerve/blood vessel/tendon damage, recurrence, need for further surgery, failed surgery, failure to improve, stiffness, skin slough, and possible amputation were discussed.  She   signed a consent.    PROCEDURE IN DETAIL:  The patient was brought to the operating room was transferred to the operating bed where all bony prominences were well padded. General anesthesia was then administered by the Anesthesiology Department.  After general anesthesia was administered a tourniquet was applied to the upper part of the patient's operative extremity.  The patient's arm was " "then prepped with chlorhexidine solution and draped in the normal sterile fashion.  After prepping and draping bony and soft tissue landmarks were palpated and incision site the patient's elbow and wrist were drawn on her extremity.  The patient's arm was then elevated, exsanguinated, and tourniquet was inflated.       Sharp incision was then made at the medial elbow incision site with a #15 blade followed by dissection to the level of the ulnar nerve.  The ulnar nerve was identified and freed from investing tissues while protecting vital structures.  After release was completed the incision was packed with a moist Ray-Trinity.       Attention was then placed on the wrist where a volar incision was made with a #15 blade at the ulnar aspect of the palmaris tendon and dissection was taken to the level of the brachioradial fascia.  The brachioradial fascia was identified and a longitudinal rent was placed in the brachioradial fascia with a #15 blade. A more ulnar incision, approximately 0.8 cm from the initial 1 was made in the brachioradial fascia and then the 2 incisions were connected making a window.  The proximal brachioradial fascia was then freed and released with a pair tenotomy scissors.  Attention was then placed on the carpal canal where a spatula was placed in the wrist incision and the tenosynovium was freed from the undersurface of the transcarpal ligament while feeling the washboard effect."  The spatula was removed and then progressive hamate Finders were placed followed by the co equal. The coequal was then removed and the camera/knife assembly was placed in the wrist and advanced to the distal extent of the transcarpal ligament. Distal extent of the transcarpal ligament was identified and then the knife blade assembly was raised and a half release was completed.  The knife blade assembly was retracted and the camera knife assembly was directed distally again and a full release was completed.  After a " full release was completed neither leaf of the transcarpal ligament could be seen in the operative camera field at the same time. The camera knife assembly was then removed from the patient's wrist and the window was freed with a pair tenotomy scissors.  The release was then checked by placing a Ragnell within the incision ensuring a complete release of the transcarpal ligament. The Ragnell was then removed from the patient's wrist and the incision was copiously irrigated.  The patient's wrist was then elevated and the tourniquet was released. After several minutes full hemostasis at the wrist was ensured and the incision was then closed with nylon suture in a horizontal mattress type of fashion.         Attention was then placed at the elbow incision where the previously moistened Ray-Trinity was placed.  The Ray-Trinity was removed and full hemostasis was ensured.  The incision was then closed in layers with Vicryl suture with final plastic closure. The elbow incision was then dressed with Mastisol, Steri-Strips, Adaptic, sterile gauze and sterile cast padding.  The wrist incision was also dressed with Adaptic, sterile gauze and sterile cast padding.  The patient had final dressings placed with sterile cast padding followed by posterior mold plaster splint and an Ace wrap.       The patient was then awakened by anesthesia and was transferred from the operating room to the recovery room in stable condition.  The patient tolerated the procedure well without complications.

## 2019-10-17 ENCOUNTER — TELEPHONE (OUTPATIENT)
Dept: ORTHOPEDICS | Facility: CLINIC | Age: 54
End: 2019-10-17

## 2019-10-17 NOTE — TELEPHONE ENCOUNTER
Called patient to ask how she is doing after surgery with Dr. Simmons and to verify her post op appointment.     No answer at phone number [phone] and left voicemail for patient to call office to speak to nurse. Voicemail also contained post op appointment information.

## 2019-10-22 ENCOUNTER — OFFICE VISIT (OUTPATIENT)
Dept: ORTHOPEDICS | Facility: CLINIC | Age: 54
End: 2019-10-22

## 2019-10-22 VITALS
WEIGHT: 134.94 LBS | HEIGHT: 63 IN | DIASTOLIC BLOOD PRESSURE: 72 MMHG | SYSTOLIC BLOOD PRESSURE: 98 MMHG | HEART RATE: 98 BPM | BODY MASS INDEX: 23.91 KG/M2 | RESPIRATION RATE: 18 BRPM

## 2019-10-22 DIAGNOSIS — Z98.890 S/P CARPAL TUNNEL RELEASE: Primary | ICD-10-CM

## 2019-10-22 DIAGNOSIS — Z48.02 ENCOUNTER FOR REMOVAL OF SUTURES: Primary | ICD-10-CM

## 2019-10-22 DIAGNOSIS — Z51.89 AFTER CARE: Primary | ICD-10-CM

## 2019-10-22 PROCEDURE — 99024 POSTOP FOLLOW-UP VISIT: CPT | Mod: ,,, | Performed by: ORTHOPAEDIC SURGERY

## 2019-10-22 PROCEDURE — 99999 PR PBB SHADOW E&M-EST. PATIENT-LVL III: CPT | Mod: PBBFAC,,, | Performed by: ORTHOPAEDIC SURGERY

## 2019-10-22 PROCEDURE — 99213 OFFICE O/P EST LOW 20 MIN: CPT | Mod: PBBFAC,PN | Performed by: ORTHOPAEDIC SURGERY

## 2019-10-22 PROCEDURE — 99024 PR POST-OP FOLLOW-UP VISIT: ICD-10-PCS | Mod: ,,, | Performed by: ORTHOPAEDIC SURGERY

## 2019-10-22 PROCEDURE — 99999 PR PBB SHADOW E&M-EST. PATIENT-LVL III: ICD-10-PCS | Mod: PBBFAC,,, | Performed by: ORTHOPAEDIC SURGERY

## 2019-10-22 NOTE — PROGRESS NOTES
Subjective:      Patient ID: Amanda Wood is a 54 y.o. female.    Chief Complaint: Post-op Evaluation of the Right Wrist      HPI:  Ms. Wood returned today for 1st postop visit on endoscopic right carpal tunnel release and an ulnar nerve decompression of her right elbow.  She states she is doing well and is relatively pain free. She denied numbness and tingling and stated her feeling is returning into her fingers.  Her date of surgery 10/15/2019.    ROS:  New diagnosis/surgery/prescriptions since last office visit on 10/09/2019:  Endoscopic right carpal tunnel release and ulnar nerve decompression right elbow.      Objective:      Physical Exam:   General: AAOx3.  No acute distress  Vascular:  Pulses intact and equal bilaterally.  Capillary refill less than 3 seconds and equal bilaterally  Neurologic:  Pinprick and soft touch intact and equal bilaterally. Tinel's resolved right hand.  Phalen's resolved right upper extremity  Integment:  Mild palmar ecchymosis right hand no errythema.  Incisions well approximated and healing well.  Extremity:  Hand:  Pronation/supination equal bilaterally.  Dorsiflexion/volar flexion equal bilaterally. Mild tenderness with palpation palmar aspect proximal right hand.  No swelling. Good finger motion without pain. Good elbow motion without pain. Able to adduct thumb.  Radiography:  No x-rays done today.      Assessment:       Impression:   1.  Endoscopic right carpal tunnel release.  2.  Ulnar nerve decompression, right elbow.      Plan:       1.  Discussed physical examination with the patient. Amanda understands that she had an endoscopic carpal tunnel release of her right hand and an ulnar nerve decompression of her right elbow.  She understands she appears to be doing well but she will have some pain in her hand for up to 3 months due to pillar pain.  2.  Remove sutures and place Steri-Strips.  3.  Refer to occupational therapy to work on hand and elbow motion.    4.  Home  exercises to include hand motion exercises and dish washing were shown discussed with the patient.  5.  Any pain can be treated with over-the-counter medications dosed per box instructions.  6.  May begin to use hands as tolerated.  7.  [facility] portal was discussed with the patient and information was given.  The patient was encouraged to use the portal for future encounters.  8.  Follow up in 1 month for re-evaluation.

## 2019-10-30 ENCOUNTER — PATIENT MESSAGE (OUTPATIENT)
Dept: ORTHOPEDICS | Facility: CLINIC | Age: 54
End: 2019-10-30

## 2019-11-04 ENCOUNTER — CLINICAL SUPPORT (OUTPATIENT)
Dept: REHABILITATION | Facility: HOSPITAL | Age: 54
End: 2019-11-04
Attending: ORTHOPAEDIC SURGERY

## 2019-11-04 DIAGNOSIS — Z98.890 S/P DECOMPRESSION OF ULNAR NERVE AT ELBOW: ICD-10-CM

## 2019-11-04 DIAGNOSIS — Z98.890 S/P CARPAL TUNNEL RELEASE: Primary | ICD-10-CM

## 2019-11-04 PROCEDURE — 97124 MASSAGE THERAPY: CPT

## 2019-11-04 PROCEDURE — 97110 THERAPEUTIC EXERCISES: CPT

## 2019-11-04 PROCEDURE — 97165 OT EVAL LOW COMPLEX 30 MIN: CPT
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
## 2019-11-11 ENCOUNTER — CLINICAL SUPPORT (OUTPATIENT)
Dept: REHABILITATION | Facility: HOSPITAL | Age: 54
End: 2019-11-11
Attending: ORTHOPAEDIC SURGERY

## 2019-11-11 DIAGNOSIS — Z98.890 S/P DECOMPRESSION OF ULNAR NERVE AT ELBOW: ICD-10-CM

## 2019-11-11 DIAGNOSIS — Z98.890 S/P CARPAL TUNNEL RELEASE: Primary | ICD-10-CM

## 2019-11-11 PROCEDURE — 97110 THERAPEUTIC EXERCISES: CPT

## 2019-11-11 PROCEDURE — 97035 APP MDLTY 1+ULTRASOUND EA 15: CPT

## 2019-11-11 PROCEDURE — 97124 MASSAGE THERAPY: CPT

## 2019-11-11 PROCEDURE — 97010 HOT OR COLD PACKS THERAPY: CPT

## 2019-11-11 NOTE — PROGRESS NOTES
Occupational Therapy Daily Treatment Note     Name: Amanda Green [facility] Number: [id number]    Therapy Diagnosis:   Encounter Diagnoses   Name Primary?    S/P carpal tunnel release Yes    S/P decompression of ulnar nerve at elbow      Physician: Kiet Simmons DO    Visit Date: 11/11/2019    Physician Orders: OT R UE  Medical Diagnosis: s/p R carpal tunnel and ulnar nerve decompression  Evaluation Date: 11/04/2019  Authorization Period Expiration: -  Plan of Care Certification Period: 01/24/2020  Visit #/Visits authorized: 2    Time In: 200  Time Out: 300  Total Billable Time: 60 minutes    Precautions: Standard    Subjective     Pt reports: stiffness today.  She was compliant with home exercise program.  Response to previous treatment: tolerated successfully  Functional change: some stiffness per report    Pain: 2/10  Location: right wrist    Objective     Amanda received hot pack for 15 minutes to R wrist and R elbow.    Amanda received therapeutic exercises to develop strength, ROM and flexibility including:  Light resist digi flex R hand x 25  AROM R wrist flex/ext  AROM R radial dev/pron  AROM R sup/pron    Amanda received the following manual therapy techniques: scar massage were applied to the: R elbow and R wrist  for 12 minutes.    Amanda received the following direct contact modalities after being cleared for contraindications: Ultrasound:  Amanda received ultrasound to manage pain and inflammation at 50 % duty cycle applied to the R elbow and R wrist at an intensity of  3.3 MHz and 2.0 W/cm2  for a duration of 10 minutes. Patient tolerated treatment well without adverse effects. Therapist was in attendance throughout intervention.      Home Exercises Provided and Patient Education Provided     Education provided:   - HEP    Written Home Exercises Provided: Patient instructed to cont prior HEP.  Exercises were reviewed and Amanda was able to demonstrate them prior to the end of the session.  Amanda  demonstrated good  understanding of the education provided.     See EMR under Media for exercises provided prior visit.    Assessment       Amanda is progressing well towards her goals.   Pt prognosis is Good.     Pt will continue to benefit from skilled outpatient occupational therapy to address the deficits listed in the problem list box on initial evaluation, provide pt/family education and to maximize pt's level of independence in the home and community environment.     Pt's spiritual, cultural and educational needs considered and pt agreeable to plan of care and goals.     Anticipated barriers to occupational therapy: none    Goals:   Patient will tolerate multiple modalities to R elbow and wrist to improve function and decrease pain.   Patient will increase R  strength by 10# within 4 weeks.   Patient will demonstrate independence in HEP.     Plan     Continue OT per established POC.    Heidy Gamez, OT

## 2019-11-13 ENCOUNTER — CLINICAL SUPPORT (OUTPATIENT)
Dept: REHABILITATION | Facility: HOSPITAL | Age: 54
End: 2019-11-13
Attending: ORTHOPAEDIC SURGERY

## 2019-11-13 DIAGNOSIS — Z98.890 S/P CARPAL TUNNEL RELEASE: Primary | ICD-10-CM

## 2019-11-13 DIAGNOSIS — Z98.890 S/P DECOMPRESSION OF ULNAR NERVE AT ELBOW: ICD-10-CM

## 2019-11-13 PROCEDURE — 97010 HOT OR COLD PACKS THERAPY: CPT

## 2019-11-13 PROCEDURE — 97110 THERAPEUTIC EXERCISES: CPT

## 2019-11-13 PROCEDURE — 97140 MANUAL THERAPY 1/> REGIONS: CPT

## 2019-11-13 NOTE — PROGRESS NOTES
Occupational Therapy Daily Treatment Note     Name: Amanda Green [facility] Number: [id number]    Therapy Diagnosis:   Encounter Diagnoses   Name Primary?    S/P carpal tunnel release Yes    S/P decompression of ulnar nerve at elbow      Physician: Kiet Simmons DO    Visit Date: 11/13/2019    Physician Orders: OT R UE  Medical Diagnosis: s/p R carpal tunnel and ulnar nerve decompression  Evaluation Date: 11/04/2019  Authorization Period Expiration: -  Plan of Care Certification Period: 01/24/2020  Visit #/Visits authorized: 2    Time In: 200  Time Out: 300  Total Billable Time: 60 minutes    Precautions: Standard    Subjective     Pt reports: stiffness today.  She was compliant with home exercise program.  Response to previous treatment: tolerated successfully  Functional change: some stiffness per report    Pain: 2/10  Location: right wrist    Objective     Amanda received hot pack for 15 minutes to R wrist and R elbow.    Amanda received therapeutic exercises to develop strength, ROM and flexibility including:  Light resist digi flex R hand x 25  AROM R wrist flex/ext  AROM R radial dev/pron  AROM R sup/pron  UBE x 5 mins MIN resist   Clothespin tree RUE all resist levels    Amanda received the following manual therapy techniques: scar massage were applied to the: R elbow and R wrist  for 12 minutes.      Home Exercises Provided and Patient Education Provided     Education provided:   - HEP    Written Home Exercises Provided: Patient instructed to cont prior HEP.  Exercises were reviewed and Amanda was able to demonstrate them prior to the end of the session.  Amanda demonstrated good  understanding of the education provided.     See EMR under Media for exercises provided prior visit.    Assessment       Amanda is progressing well towards her goals.   Pt prognosis is Good.     Pt will continue to benefit from skilled outpatient occupational therapy to address the deficits listed in the problem list box on  initial evaluation, provide pt/family education and to maximize pt's level of independence in the home and community environment.     Pt's spiritual, cultural and educational needs considered and pt agreeable to plan of care and goals.     Anticipated barriers to occupational therapy: none    Goals:   Patient will tolerate multiple modalities to R elbow and wrist to improve function and decrease pain.   Patient will increase R  strength by 10# within 4 weeks.   Patient will demonstrate independence in HEP.     Plan     Continue OT per established POC.    Heidy Gamez, OT

## 2019-11-19 ENCOUNTER — CLINICAL SUPPORT (OUTPATIENT)
Dept: REHABILITATION | Facility: HOSPITAL | Age: 54
End: 2019-11-19
Attending: ORTHOPAEDIC SURGERY

## 2019-11-19 DIAGNOSIS — Z98.890 S/P CARPAL TUNNEL RELEASE: Primary | ICD-10-CM

## 2019-11-19 DIAGNOSIS — Z98.890 S/P DECOMPRESSION OF ULNAR NERVE AT ELBOW: ICD-10-CM

## 2019-11-19 PROCEDURE — 97110 THERAPEUTIC EXERCISES: CPT

## 2019-11-19 PROCEDURE — 97010 HOT OR COLD PACKS THERAPY: CPT

## 2019-11-19 NOTE — PROGRESS NOTES
Occupational Therapy Daily Treatment Note     Name: Amanda Green [facility] Number: [id number]    Therapy Diagnosis:   Encounter Diagnoses   Name Primary?    S/P carpal tunnel release Yes    S/P decompression of ulnar nerve at elbow      Physician: Kiet Simmons DO    Visit Date: 11/19/2019    Physician Orders: OT R UE  Medical Diagnosis: s/p R carpal tunnel and ulnar nerve decompression  Evaluation Date: 11/04/2019  Authorization Period Expiration: -  Plan of Care Certification Period: 01/24/2020  Visit #/Visits authorized: 3    Time In: 300  Time Out: 345  Total Billable Time: 45 minutes    Precautions: Standard    Subjective     Pt reports: redness at site of ulnar decompression incision end with redness and reports that it had pus come out over the weekend.   She was compliant with home exercise program.  Response to previous treatment: tolerated successfully  Functional change: some stiffness per report    Pain: 2/10  Location: right wrist    Objective     Amanda received hot pack for 15 minutes to R wrist and R elbow.    Amanda received therapeutic exercises to develop strength, ROM and flexibility including:  Light resist digi flex R hand x 50  AROM R wrist ext using 2# dumbbell  AROM R wrist flex using 2# dumbbell  AROM R radial dev/pron using 2# dumbbell  AROM R sup/pron using 2# dumbbell   UBE x 6 mins MIN resist   Clothespin tree RUE all resist levels RUE      Home Exercises Provided and Patient Education Provided     Education provided:   - HEP    Written Home Exercises Provided: Patient instructed to cont prior HEP.  Exercises were reviewed and Amanda was able to demonstrate them prior to the end of the session.  Amanda demonstrated good  understanding of the education provided.     See EMR under Media for exercises provided prior visit.    Assessment   Site on end of incision scar is slightly red with dry appearance. Patient reports she had pus come out over the weekend. OT advised her to use  neosporin on it. Site appears to be healing and is not leaking any fluid today. She sees MD Friday.    Amanda is progressing well towards her goals.   Pt prognosis is Good.     Pt will continue to benefit from skilled outpatient occupational therapy to address the deficits listed in the problem list box on initial evaluation, provide pt/family education and to maximize pt's level of independence in the home and community environment.     Pt's spiritual, cultural and educational needs considered and pt agreeable to plan of care and goals.     Anticipated barriers to occupational therapy: none    Goals:   Patient will tolerate multiple modalities to R elbow and wrist to improve function and decrease pain.   Patient will increase R  strength by 10# within 4 weeks.   Patient will demonstrate independence in HEP.     Plan     Continue OT per established POC.    Heidy Gamez, OT

## 2019-11-22 ENCOUNTER — OFFICE VISIT (OUTPATIENT)
Dept: ORTHOPEDICS | Facility: CLINIC | Age: 54
End: 2019-11-22

## 2019-11-22 VITALS
WEIGHT: 134.94 LBS | HEIGHT: 63 IN | BODY MASS INDEX: 23.91 KG/M2 | SYSTOLIC BLOOD PRESSURE: 122 MMHG | DIASTOLIC BLOOD PRESSURE: 77 MMHG | HEART RATE: 108 BPM

## 2019-11-22 DIAGNOSIS — G56.02 LEFT CARPAL TUNNEL SYNDROME: ICD-10-CM

## 2019-11-22 DIAGNOSIS — G56.22 CUBITAL TUNNEL SYNDROME ON LEFT: ICD-10-CM

## 2019-11-22 DIAGNOSIS — Z01.818 PRE-OP TESTING: Primary | ICD-10-CM

## 2019-11-22 DIAGNOSIS — Z98.890 S/P CUBITAL TUNNEL RELEASE: ICD-10-CM

## 2019-11-22 DIAGNOSIS — M25.532 LEFT WRIST PAIN: ICD-10-CM

## 2019-11-22 DIAGNOSIS — Z98.890 S/P CARPAL TUNNEL RELEASE: ICD-10-CM

## 2019-11-22 PROCEDURE — 99213 OFFICE O/P EST LOW 20 MIN: CPT | Mod: 57,S$PBB,, | Performed by: ORTHOPAEDIC SURGERY

## 2019-11-22 PROCEDURE — 99213 PR OFFICE/OUTPT VISIT, EST, LEVL III, 20-29 MIN: ICD-10-PCS | Mod: 57,S$PBB,, | Performed by: ORTHOPAEDIC SURGERY

## 2019-11-22 PROCEDURE — 99999 PR PBB SHADOW E&M-EST. PATIENT-LVL III: CPT | Mod: PBBFAC,,, | Performed by: ORTHOPAEDIC SURGERY

## 2019-11-22 PROCEDURE — 99213 OFFICE O/P EST LOW 20 MIN: CPT | Mod: PBBFAC,PN | Performed by: ORTHOPAEDIC SURGERY

## 2019-11-22 PROCEDURE — 99999 PR PBB SHADOW E&M-EST. PATIENT-LVL III: ICD-10-PCS | Mod: PBBFAC,,, | Performed by: ORTHOPAEDIC SURGERY

## 2019-11-22 NOTE — H&P
Chief Complaint: Pain of the Right Hand and Pain of the Left Hand     HPI:  Ms. Wood is a 54-year-old right-hand-dominant lady who returned today with complaints of increasing numbness and tingling in her left hand.  She had a right carpal tunnel release with ulnar nerve decompression at her elbow on 10/15/2019 and is doing extremely well. The feeling as returned to her fingers of her right hand.  She still has numbness and tingling in the fingers of her left hand.  She has been symptomatic for approximately 10 months.  The symptoms of her left hand awaken her at night.  She drops items with her left hand.  The ring and small finger on the left hand are primarily affected although she does have numbness and tingling in the other fingers.  She has taken NSAIDs and worn a brace without help.  She is currently doing physical therapy for right hand which is helping to improve her symptoms.  She does have some pillar type pain which is increased with shaking Peoples hands in her right hand.  She has had injections in her left hand without improvement.  The date of surgery of the patient's right hand/elbow was 10/15/2019, she is now approximately 6 weeks postop.          Past Medical History:   Diagnosis Date    Hypoglycemia       *  *  *  *  *  *  * Thyroid disease  Seasonal allergies  Depression  Anxiety  Headaches  Seizures  PTSD  History of suicide attempt              Past Surgical History:   Procedure Laterality Date    KNEE SURGERY patella repair left        LAPAROSCOPIC CHOLECYSTECTOMY        MOLE REMOVAL        THYROIDECTOMY Bilateral      TONSILLECTOMY        FLEXOR TENDON REPAIR LEFT WRIST after suicide attempt         *  * PRK BILATERAL EYES  ENDOSCOPIC RIGHT CARPAL TUNNEL RELEASED.  ULNAR NERVE DECOMPRESSION RIGHT ELBOW.             Review of patient's allergies indicates:  No Known Allergies     Social History           Occupational History    Currently unemployed   Tobacco Use    Smoking status:  Current Every Day Smoker 1 pack per day for 43 years    Smokeless tobacco: Never Used   Substance and Sexual Activity    Alcohol use: Yes    Drug use: Smokes marijuana    Sexual activity: Not Currently            Family History   Problem Relation Age of Onset    Cancer Mother      Parkinsonism Father      Heart attack Father      Irritable bowel syndrome Sister      Parkinsonism Paternal Uncle           Previous Hospitalizations:  Suicide attempt, patella reconstruction, kidney infection.     ROS:  No new diagnosis/surgery/prescriptions since last office visit on 10/22/2019.  Constitution: Negative for chills and fever.   HENT: Negative for congestion.    Eyes: Negative for blurred vision.   Cardiovascular: Negative for chest pain.   Respiratory: Negative for cough.    Endocrine: Negative for polydipsia.   Hematologic/Lymphatic: Negative for adenopathy.   Skin: Negative for flushing, itching and skin cancer.   Musculoskeletal: Negative for gout.   Gastrointestinal: Negative for constipation, diarrhea and heartburn.   Genitourinary: Negative for nocturia.   Neurological: Positive for headaches and seizures.   Psychiatric/Behavioral: Positive for depression and substance abuse. The patient is nervous/anxious.    Allergic/Immunologic: Positive for environmental allergies.             Objective:      Physical Exam:   General: AAOx3.  No acute distress  HEENT: Normocephalic, PEARLA EOMI, poor dentition  Neck: Supple, No JVD  Chest: Symetric, equal excursion on inspiration  Abdomen: Soft NTND  Vascular:  Pulses intact and equal bilaterally.  Capillary refill less than 3 seconds and equal bilaterally  Neurologic:  Pinprick and soft touch intact and equal bilaterally. Tinel's positive left wrist and elbow.  Elbow flexion test positive left elbow.  Phalen's test positive left upper extremity. Spurling's negative.  Integment:  No ecchymosis, no errythema.  Multiple tattoos. cicatrix volar aspect left wrist. Mild  spitting of suture right elbow.  Incisions well approximated and well healed.  Extremity:  Wrist:  Pronation/supination equal bilaterally 85/85 degrees. Dorsiflexion/volar flexion equal bilaterally 80/75 degrees. Nontender in the anatomic snuffbox bilaterally. Nontender at the 1st dorsal compartment bilaterally.  Finkelstein's negative bilaterally. No swelling at the 1st dorsal compartment bilaterally. Nontender at the scapholunate interval bilaterally. Brizuela's test negative bilaterally. Nontender at the DRUJ/TFCC bilaterally.  Nontender with forced ulnar deviation bilaterally. Positive thenar and hypothenar atrophy both hands left greater than right.  Wartenberg sign negative bilaterally. Tender with  right hand.  Durkan's test positive left hand.  Grind test negative both hands.                       Elbow:  Pronation/supination equal bilaterally 85/85 degrees. Extension/flexion equal bilaterally 0/128 degrees. Radial/ulna stressing equal bilaterally with endpoint.  Nontender over the medial or lateral epicondyles.  Nontender over the radial head.  No crepitus with motion radial head.  Nontender over the olecranon insertion of the triceps.  Triceps palpable throughout full distribution.  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  Electrodiagnostic studies:  Nerve conduction study completed on the right upper extremity was consistent with moderate to severe carpal tunnel syndrome.      Assessment:       Impression:       1. Left carpal tunnel syndrome    2.  3.  4. Clinical cubital tunnel syndrome left elbow   Endoscopic right carpal tunnel release  Ulnar nerve decompression right elbow          Plan:       1.  Discussed physical examination with the patient. She was doing well with her carpal tunnel release and ulnar nerve decompression of her right hand.  She stated that since she is doing well she would like to consider surgery on her left upper extremity.   Amanda understands that she still has carpal tunnel and cubital tunnel syndrome of the left upper extremity.  She could either continue with conservative management or proceed with surgery on her left upper extremity. She has failed NSAIDs, injections, and bracing would like to proceed with surgery on her left upper extremity since she had a good result with the right .  2.  Possible complications of surgery to include bleeding, infection, scarring, nerve/blood vessel/tendon damage, need for further surgery, failed surgery, failure to improve, possible persistent pain, possible recurrence, possible failure to improve, and possible stiffness were discussed with the patient.  She was permitted to ask questions and all concerns were addressed to her satisfaction.  3.  Consent for endoscopic versus open left carpal tunnel release and ulnar nerve decompression left elbow.  4.  Tentatively schedule surgery for 12/10/2019.  5.  All postoperative meds will be given on the date of surgery.  6.   continue with home exercises to include vitamin-E massages to the carpal canal as previously shown discussed.  7.  Continue with cock-up wrist splint as previously prescribed.  8.  Continue with NSAIDs as tolerated allowed by PCM.  9.  Remove spitting suture from right elbow.  10.  Continue with physical therapy on the right hand.  Expect she will also need to do it on her left hand postoperatively.  11. [facility] portal was discussed with the patient and information was given.  The patient was encouraged to use the portal for future encounters.  12.  Follow up approximately 10-12 days postoperatively.

## 2019-11-25 DIAGNOSIS — Z01.818 PRE-OP EXAM: Primary | ICD-10-CM

## 2019-11-25 DIAGNOSIS — G56.22 CUBITAL TUNNEL SYNDROME ON LEFT: ICD-10-CM

## 2019-11-25 DIAGNOSIS — G56.02 LEFT CARPAL TUNNEL SYNDROME: ICD-10-CM

## 2019-11-25 DIAGNOSIS — G56.00 CARPAL TUNNEL SYNDROME: ICD-10-CM

## 2019-11-25 RX ORDER — SODIUM CHLORIDE 9 MG/ML
INJECTION, SOLUTION INTRAVENOUS CONTINUOUS
Status: CANCELLED | OUTPATIENT
Start: 2019-11-25

## 2019-11-25 RX ORDER — MUPIROCIN 20 MG/G
OINTMENT TOPICAL
Status: CANCELLED | OUTPATIENT
Start: 2019-11-25

## 2019-11-26 ENCOUNTER — CLINICAL SUPPORT (OUTPATIENT)
Dept: REHABILITATION | Facility: HOSPITAL | Age: 54
End: 2019-11-26
Attending: ORTHOPAEDIC SURGERY

## 2019-11-26 DIAGNOSIS — Z98.890 S/P DECOMPRESSION OF ULNAR NERVE AT ELBOW: ICD-10-CM

## 2019-11-26 DIAGNOSIS — Z98.890 S/P CARPAL TUNNEL RELEASE: Primary | ICD-10-CM

## 2019-11-26 PROCEDURE — 97110 THERAPEUTIC EXERCISES: CPT

## 2019-11-26 PROCEDURE — 97010 HOT OR COLD PACKS THERAPY: CPT

## 2019-11-26 PROCEDURE — 97035 APP MDLTY 1+ULTRASOUND EA 15: CPT

## 2019-11-26 PROCEDURE — 97124 MASSAGE THERAPY: CPT

## 2019-11-26 NOTE — PROGRESS NOTES
Occupational Therapy Daily Treatment Note     Name: Amanda Green [facility] Number: [id number]    Therapy Diagnosis:   Encounter Diagnoses   Name Primary?    S/P carpal tunnel release Yes    S/P decompression of ulnar nerve at elbow      Physician: Kiet Simmons DO    Visit Date: 11/26/2019    Physician Orders: OT R UE  Medical Diagnosis: s/p R carpal tunnel and ulnar nerve decompression  Evaluation Date: 11/04/2019  Authorization Period Expiration: -  Plan of Care Certification Period: 01/24/2020  Visit #/Visits authorized: 5    Time In: 400  Time Out: 500  Total Billable Time: 60 minutes    Precautions: Standard    Subjective     Pt reports: stitch was removed at irritated site of decompression R elbow and she plans to undergo surgery on LUE for decompression and carpal tunnel release.   She was compliant with home exercise program.  Response to previous treatment: tolerated successfully  Functional change: some stiffness per report    Pain: 2/10  Location: right wrist    Objective     Amanda received hot pack for 15 minutes to R wrist and R elbow.    Amanda received scar massage to R elbow scar s/p decompression x 10 mins.    Amanda received therapeutic exercises to develop strength, ROM and flexibility including:  AROM R wrist ext using 2# dumbbell x 15  AROM R wrist flex using 2# dumbbell x 15  AROM R radial dev/pron using 2# dumbbell x 15  AROM R sup/pron using 2# dumbbell x 15  UBE x 5 mins MIN resist   Clothespin tree RUE all resist levels RUE  Digi extension light resist R hand x 30     Amanda received the following direct contact modalities after being cleared for contraindications: Ultrasound:  Amanda received ultrasound to manage pain and inflammation at 50 % duty cycle applied to the R elbow and R wrist at an intensity of  3.3 MHz and 2.0 W/cm2  for a duration of 10 minutes. Patient tolerated treatment well without adverse effects. Therapist was in attendance throughout intervention.      Home  Exercises Provided and Patient Education Provided     Education provided:   - HEP    Written Home Exercises Provided: Patient instructed to cont prior HEP.  Exercises were reviewed and Amanda was able to demonstrate them prior to the end of the session.  Amanda demonstrated good  understanding of the education provided.     See EMR under Media for exercises provided prior visit.    Assessment   Patient has completed 5/8 visits in this POC. Discussed she has 3 more visits and we will continue to address strengthening and modalities RUE. She will undergo surgery for LUE on 12/10/2019.    Amanda is progressing well towards her goals.   Pt prognosis is Good.     Pt will continue to benefit from skilled outpatient occupational therapy to address the deficits listed in the problem list box on initial evaluation, provide pt/family education and to maximize pt's level of independence in the home and community environment.     Pt's spiritual, cultural and educational needs considered and pt agreeable to plan of care and goals.     Anticipated barriers to occupational therapy: none    Goals:   Patient will tolerate multiple modalities to R elbow and wrist to improve function and decrease pain.   Patient will increase R  strength by 10# within 4 weeks.   Patient will demonstrate independence in HEP.     Plan     Continue OT per established POC.    Heidy Gamez, OT

## 2019-12-03 ENCOUNTER — ANESTHESIA EVENT (OUTPATIENT)
Dept: SURGERY | Facility: HOSPITAL | Age: 54
End: 2019-12-03

## 2019-12-03 ENCOUNTER — HOSPITAL ENCOUNTER (OUTPATIENT)
Dept: PREADMISSION TESTING | Facility: HOSPITAL | Age: 54
Discharge: HOME | End: 2019-12-03
Attending: ORTHOPAEDIC SURGERY

## 2019-12-03 NOTE — ANESTHESIA PREPROCEDURE EVALUATION
12/03/2019  Amanda Wood is a 54 y.o., female.    Anesthesia Evaluation    I have reviewed the Patient Summary Reports.    I have reviewed the Nursing Notes.   I have reviewed the Medications.     Review of Systems  Anesthesia Hx:  No problems with previous Anesthesia    Social:  Smoker    Hematology/Oncology:  Hematology Normal   Oncology Normal     EENT/Dental:EENT/Dental Normal   Cardiovascular:  Cardiovascular Normal     Pulmonary:  Pulmonary Normal    Renal/:  Renal/ Normal     Hepatic/GI:  Hepatic/GI Normal    Musculoskeletal:  Musculoskeletal Normal    Neurological:  Neurology Normal    Endocrine:   Hypothyroidism    Dermatological:  Skin Normal    Psych:  Psychiatric Normal           Physical Exam  General:  Well nourished    Airway/Jaw/Neck:  Airway Findings: Mouth Opening: Normal General Airway Assessment: Adult  Mallampati: II  TM Distance: 4 - 6 cm       Chest/Lungs:  Chest/Lungs Findings:    Heart/Vascular:  Heart Findings: Rate: Normal  Rhythm: Regular Rhythm        Mental Status:  Mental Status Findings:  Cooperative, Alert and Oriented         Anesthesia Plan  Type of Anesthesia, risks & benefits discussed:  Anesthesia Type:  general  Patient's Preference:   Intra-op Monitoring Plan: standard ASA monitors  Intra-op Monitoring Plan Comments:   Post Op Pain Control Plan: IV/PO Opioids PRN  Post Op Pain Control Plan Comments:   Induction:   IV  Beta Blocker:  Patient is not currently on a Beta-Blocker (No further documentation required).       Informed Consent: Patient understands risks and agrees with Anesthesia plan.  Questions answered. Anesthesia consent signed with patient.  ASA Score: 2     Day of Surgery Review of History & Physical: I have interviewed and examined the patient. I have reviewed the patient's H&P dated:            Ready For Surgery From Anesthesia Perspective.

## 2019-12-03 NOTE — PLAN OF CARE
Patient given Hibiclens to wash with the night before and morning of surgery. NPO past midnight. Have a ride home from the hospital. Instructed on what medications to take/etc.

## 2019-12-04 ENCOUNTER — CLINICAL SUPPORT (OUTPATIENT)
Dept: REHABILITATION | Facility: HOSPITAL | Age: 54
End: 2019-12-04
Attending: ORTHOPAEDIC SURGERY

## 2019-12-04 DIAGNOSIS — Z98.890 S/P CARPAL TUNNEL RELEASE: Primary | ICD-10-CM

## 2019-12-04 DIAGNOSIS — Z98.890 S/P DECOMPRESSION OF ULNAR NERVE AT ELBOW: ICD-10-CM

## 2019-12-04 PROCEDURE — 97124 MASSAGE THERAPY: CPT

## 2019-12-04 PROCEDURE — 97110 THERAPEUTIC EXERCISES: CPT

## 2019-12-04 PROCEDURE — 97010 HOT OR COLD PACKS THERAPY: CPT

## 2019-12-04 NOTE — PROGRESS NOTES
Occupational Therapy Daily Treatment Note     Name: Amanda Green [facility] Number: [id number]    Therapy Diagnosis:   Encounter Diagnoses   Name Primary?    S/P carpal tunnel release Yes    S/P decompression of ulnar nerve at elbow      Physician: Kiet Simmons DO    Visit Date: 12/4/2019    Physician Orders: OT R UE  Medical Diagnosis: s/p R carpal tunnel and ulnar nerve decompression  Evaluation Date: 11/04/2019  Authorization Period Expiration: -  Plan of Care Certification Period: 01/24/2020  Visit #/Visits authorized: 6    Time In: 400  Time Out: 500  Total Billable Time: 55 minutes    Precautions: Standard    Subjective     Pt reports: she will have surgery next week for LUE releases   She was compliant with home exercise program.  Response to previous treatment: tolerated successfully  Functional change: some stiffness per report    Pain: 2/10  Location: right wrist    Objective     Amanda received hot pack for 15 minutes to R wrist and R elbow.    Amanda received therapeutic exercises to develop strength, ROM and flexibility including:  MOD resist digi flex R hand x 50  Light resist digi extension R hand x 30   UBE x 6 mins MIN resist   Clothespin tree RUE all resist levels RUE   Power flexor x 30 5# resist BUE    Amanda received scar massage to R elbow scar s/p decompression and R wrist x 12 mins.      Home Exercises Provided and Patient Education Provided     Education provided:   - HEP    Written Home Exercises Provided: Patient instructed to cont prior HEP.  Exercises were reviewed and Amanda was able to demonstrate them prior to the end of the session.  Amanda demonstrated good  understanding of the education provided.     See EMR under Media for exercises provided prior visit.    Assessment   Complete this POC with discharge. She will undergo surgery for LUE on 12/11/2019.    Amanda is progressing well towards her goals.   Pt prognosis is Good.     Pt's spiritual, cultural and educational needs  considered and pt agreeable to plan of care and goals.     Anticipated barriers to occupational therapy: none    Goals:   Patient will tolerate multiple modalities to R elbow and wrist to improve function and decrease pain.   Patient will increase R  strength by 10# within 4 weeks.   Patient will demonstrate independence in HEP.     Plan     Continue OT per established POC.    Heidy Gamez OT       Outpatient Therapy Discharge Summary     Name: Amanda Green [facility] Number: [id number]    Therapy Diagnosis:   Encounter Diagnoses   Name Primary?    S/P carpal tunnel release Yes    S/P decompression of ulnar nerve at elbow      Physician: Kiet Simmons DO    Physician Orders: RUE s/p R carpal tunnel and ulnar nerve decompression  Medical Diagnosis: above  Evaluation Date: 11/04/2019      Date of Last visit: 12/04/2019  Total Visits Received: 6      Assessment    Goals: completed and met  Patient will tolerate multiple modalities to R elbow and wrist to improve function and decrease pain.   Patient will increase R  strength by 10# within 4 weeks.   Patient will demonstrate independence in HEP.     Discharge reason: Patient has met all of his/her goals, Patient requested discharge and Other:  Patient is doing well postop RUE and is undergoing surgery for LUE on 12/11/2019    Plan   This patient is discharged from Occupational Therapy    Heidy Gamez OT

## 2019-12-10 ENCOUNTER — ANESTHESIA (OUTPATIENT)
Dept: SURGERY | Facility: HOSPITAL | Age: 54
End: 2019-12-10

## 2019-12-10 ENCOUNTER — HOSPITAL ENCOUNTER (OUTPATIENT)
Facility: HOSPITAL | Age: 54
Discharge: HOME | End: 2019-12-10
Attending: ORTHOPAEDIC SURGERY | Admitting: ORTHOPAEDIC SURGERY

## 2019-12-10 VITALS
WEIGHT: 135 LBS | TEMPERATURE: 98 F | DIASTOLIC BLOOD PRESSURE: 88 MMHG | RESPIRATION RATE: 20 BRPM | BODY MASS INDEX: 23.92 KG/M2 | HEART RATE: 96 BPM | SYSTOLIC BLOOD PRESSURE: 116 MMHG | HEIGHT: 63 IN | OXYGEN SATURATION: 95 %

## 2019-12-10 DIAGNOSIS — Z01.818 PRE-OP EXAM: ICD-10-CM

## 2019-12-10 DIAGNOSIS — G56.02 LEFT CARPAL TUNNEL SYNDROME: ICD-10-CM

## 2019-12-10 DIAGNOSIS — G56.00 CARPAL TUNNEL SYNDROME: Primary | ICD-10-CM

## 2019-12-10 DIAGNOSIS — G56.22 CUBITAL TUNNEL SYNDROME ON LEFT: ICD-10-CM

## 2019-12-10 PROCEDURE — 25000003 PHARM REV CODE 250: Performed by: ORTHOPAEDIC SURGERY

## 2019-12-10 PROCEDURE — 71000033 HC RECOVERY, INTIAL HOUR: Performed by: ORTHOPAEDIC SURGERY

## 2019-12-10 PROCEDURE — 71000015 HC POSTOP RECOV 1ST HR: Performed by: ORTHOPAEDIC SURGERY

## 2019-12-10 PROCEDURE — D9220A PRA ANESTHESIA: Mod: ,,, | Performed by: ANESTHESIOLOGY

## 2019-12-10 PROCEDURE — 64718 PR REVISE ULNAR NERVE AT ELBOW: ICD-10-PCS | Mod: LT,,, | Performed by: ORTHOPAEDIC SURGERY

## 2019-12-10 PROCEDURE — 63600175 PHARM REV CODE 636 W HCPCS: Performed by: ANESTHESIOLOGY

## 2019-12-10 PROCEDURE — 63600175 PHARM REV CODE 636 W HCPCS: Performed by: NURSE ANESTHETIST, CERTIFIED REGISTERED

## 2019-12-10 PROCEDURE — 36000707: Performed by: ORTHOPAEDIC SURGERY

## 2019-12-10 PROCEDURE — 63600175 PHARM REV CODE 636 W HCPCS

## 2019-12-10 PROCEDURE — 64721 CARPAL TUNNEL SURGERY: CPT | Mod: 51,LT,, | Performed by: ORTHOPAEDIC SURGERY

## 2019-12-10 PROCEDURE — 37000009 HC ANESTHESIA EA ADD 15 MINS: Performed by: ORTHOPAEDIC SURGERY

## 2019-12-10 PROCEDURE — 37000008 HC ANESTHESIA 1ST 15 MINUTES: Performed by: ORTHOPAEDIC SURGERY

## 2019-12-10 PROCEDURE — 25000003 PHARM REV CODE 250: Performed by: NURSE ANESTHETIST, CERTIFIED REGISTERED

## 2019-12-10 PROCEDURE — 63600175 PHARM REV CODE 636 W HCPCS: Performed by: ORTHOPAEDIC SURGERY

## 2019-12-10 PROCEDURE — 64721 PR REVISE MEDIAN N/CARPAL TUNNEL SURG: ICD-10-PCS | Mod: 51,LT,, | Performed by: ORTHOPAEDIC SURGERY

## 2019-12-10 PROCEDURE — 64718 REVISE ULNAR NERVE AT ELBOW: CPT | Mod: LT,,, | Performed by: ORTHOPAEDIC SURGERY

## 2019-12-10 PROCEDURE — D9220A PRA ANESTHESIA: ICD-10-PCS | Mod: ,,, | Performed by: ANESTHESIOLOGY

## 2019-12-10 PROCEDURE — 36000706: Performed by: ORTHOPAEDIC SURGERY

## 2019-12-10 RX ORDER — ONDANSETRON 2 MG/ML
INJECTION INTRAMUSCULAR; INTRAVENOUS
Status: COMPLETED
Start: 2019-12-10 | End: 2019-12-10

## 2019-12-10 RX ORDER — LIDOCAINE HYDROCHLORIDE 10 MG/ML
INJECTION, SOLUTION EPIDURAL; INFILTRATION; INTRACAUDAL; PERINEURAL
Status: DISCONTINUED
Start: 2019-12-10 | End: 2019-12-10 | Stop reason: HOSPADM

## 2019-12-10 RX ORDER — LIDOCAINE HYDROCHLORIDE 10 MG/ML
1 INJECTION, SOLUTION EPIDURAL; INFILTRATION; INTRACAUDAL; PERINEURAL ONCE
Status: DISCONTINUED | OUTPATIENT
Start: 2019-12-10 | End: 2019-12-10 | Stop reason: HOSPADM

## 2019-12-10 RX ORDER — KETOROLAC TROMETHAMINE 30 MG/ML
INJECTION, SOLUTION INTRAMUSCULAR; INTRAVENOUS
Status: DISCONTINUED
Start: 2019-12-10 | End: 2019-12-10 | Stop reason: HOSPADM

## 2019-12-10 RX ORDER — MUPIROCIN 20 MG/G
OINTMENT TOPICAL
Status: DISCONTINUED | OUTPATIENT
Start: 2019-12-10 | End: 2019-12-10 | Stop reason: HOSPADM

## 2019-12-10 RX ORDER — SODIUM CHLORIDE 9 MG/ML
INJECTION, SOLUTION INTRAVENOUS CONTINUOUS
Status: DISCONTINUED | OUTPATIENT
Start: 2019-12-10 | End: 2019-12-10 | Stop reason: HOSPADM

## 2019-12-10 RX ORDER — ONDANSETRON 2 MG/ML
4 INJECTION INTRAMUSCULAR; INTRAVENOUS DAILY PRN
Status: COMPLETED | OUTPATIENT
Start: 2019-12-10 | End: 2019-12-10

## 2019-12-10 RX ORDER — MIDAZOLAM HYDROCHLORIDE 1 MG/ML
INJECTION, SOLUTION INTRAMUSCULAR; INTRAVENOUS
Status: DISCONTINUED | OUTPATIENT
Start: 2019-12-10 | End: 2019-12-10

## 2019-12-10 RX ORDER — MEPERIDINE HYDROCHLORIDE 50 MG/ML
INJECTION INTRAMUSCULAR; INTRAVENOUS; SUBCUTANEOUS
Status: DISCONTINUED | OUTPATIENT
Start: 2019-12-10 | End: 2019-12-10

## 2019-12-10 RX ORDER — SODIUM CHLORIDE, SODIUM LACTATE, POTASSIUM CHLORIDE, CALCIUM CHLORIDE 600; 310; 30; 20 MG/100ML; MG/100ML; MG/100ML; MG/100ML
INJECTION, SOLUTION INTRAVENOUS CONTINUOUS
Status: DISCONTINUED | OUTPATIENT
Start: 2019-12-10 | End: 2019-12-10 | Stop reason: HOSPADM

## 2019-12-10 RX ORDER — KETOROLAC TROMETHAMINE 30 MG/ML
15 INJECTION, SOLUTION INTRAMUSCULAR; INTRAVENOUS ONCE
Status: COMPLETED | OUTPATIENT
Start: 2019-12-10 | End: 2019-12-10

## 2019-12-10 RX ORDER — GLYCOPYRROLATE 0.2 MG/ML
INJECTION INTRAMUSCULAR; INTRAVENOUS
Status: DISCONTINUED | OUTPATIENT
Start: 2019-12-10 | End: 2019-12-10

## 2019-12-10 RX ORDER — SODIUM CHLORIDE, SODIUM LACTATE, POTASSIUM CHLORIDE, CALCIUM CHLORIDE 600; 310; 30; 20 MG/100ML; MG/100ML; MG/100ML; MG/100ML
125 INJECTION, SOLUTION INTRAVENOUS CONTINUOUS
Status: DISCONTINUED | OUTPATIENT
Start: 2019-12-10 | End: 2019-12-10 | Stop reason: HOSPADM

## 2019-12-10 RX ORDER — ONDANSETRON 2 MG/ML
INJECTION INTRAMUSCULAR; INTRAVENOUS
Status: DISCONTINUED | OUTPATIENT
Start: 2019-12-10 | End: 2019-12-10

## 2019-12-10 RX ORDER — PROPOFOL 10 MG/ML
VIAL (ML) INTRAVENOUS
Status: DISCONTINUED | OUTPATIENT
Start: 2019-12-10 | End: 2019-12-10

## 2019-12-10 RX ORDER — MORPHINE SULFATE 4 MG/ML
2 INJECTION, SOLUTION INTRAMUSCULAR; INTRAVENOUS EVERY 5 MIN PRN
Status: DISCONTINUED | OUTPATIENT
Start: 2019-12-10 | End: 2019-12-10 | Stop reason: HOSPADM

## 2019-12-10 RX ORDER — OXYCODONE AND ACETAMINOPHEN 5; 325 MG/1; MG/1
1 TABLET ORAL
Status: DISCONTINUED | OUTPATIENT
Start: 2019-12-10 | End: 2019-12-10 | Stop reason: HOSPADM

## 2019-12-10 RX ADMIN — PROPOFOL 150 MG: 10 INJECTION, EMULSION INTRAVENOUS at 10:12

## 2019-12-10 RX ADMIN — GLYCOPYRROLATE 0.4 MG: 0.2 INJECTION INTRAMUSCULAR; INTRAVENOUS at 11:12

## 2019-12-10 RX ADMIN — PROPOFOL 50 MG: 10 INJECTION, EMULSION INTRAVENOUS at 10:12

## 2019-12-10 RX ADMIN — ONDANSETRON 4 MG: 2 INJECTION INTRAMUSCULAR; INTRAVENOUS at 12:12

## 2019-12-10 RX ADMIN — SODIUM CHLORIDE: 0.9 INJECTION, SOLUTION INTRAVENOUS at 10:12

## 2019-12-10 RX ADMIN — MIDAZOLAM HYDROCHLORIDE 1 MG: 1 INJECTION, SOLUTION INTRAMUSCULAR; INTRAVENOUS at 10:12

## 2019-12-10 RX ADMIN — ONDANSETRON 4 MG: 2 INJECTION INTRAMUSCULAR; INTRAVENOUS at 11:12

## 2019-12-10 RX ADMIN — KETOROLAC TROMETHAMINE 15 MG: 30 INJECTION, SOLUTION INTRAMUSCULAR at 12:12

## 2019-12-10 RX ADMIN — MUPIROCIN: 20 OINTMENT TOPICAL at 10:12

## 2019-12-10 RX ADMIN — MORPHINE SULFATE 2 MG: 4 INJECTION, SOLUTION INTRAMUSCULAR; INTRAVENOUS at 12:12

## 2019-12-10 RX ADMIN — MEPERIDINE HYDROCHLORIDE 25 MG: 50 INJECTION INTRAMUSCULAR; INTRAVENOUS; SUBCUTANEOUS at 11:12

## 2019-12-10 NOTE — OP NOTE
[facility]  Orthopedic Surgery    12/10/2019    Amanda Wood  [id number]      PREOPERATIVE DIAGNOSIS:   1.  Left carpal tunnel syndrome [G56.02]  2.  Cubital tunnel syndrome on left [G56.22]    POSTOPERATIVE DIAGNOSIS:    1.  Carpal tunnel syndrome, left wrist.  2.  Cubital tunnel syndrome, left elbow.    PROCEDURE:  1.  Endoscopic carpal tunnel release, left wrist.  2.  Ulnar nerve decompression, left elbow.  3.  Long-arm plaster splint, left upper extremity.    SURGEON: Kiet Simmons D.O.    ASSISTANT:  None.    ANESTHESIA:  General.    BLOOD LOSS:  Less than 5 cc per    TOURNIQUET:  17 min.    DRAINS:  None.    PATHOLOGY:  None.    COMPLICATION:  None.    INDICATIONS FOR PROCEDURE: Ms. Wood is a 54-year-old right-hand-dominant lady who  has complaints of increasing numbness and tingling in her left hand.  She had a right carpal tunnel release with ulnar nerve decompression and had great improvement of her symptoms. She has been symptomatic for approximately 10 months.  The symptoms of her left hand awaken her at night.  She drops items with her left hand.  The ring and small finger on the left hand are primarily affected although she does have numbness and tingling in the other fingers.  She has taken NSAIDs and worn a brace without help.  She is currently doing physical therapy for right hand which is helping to improve her symptoms.  She has had injections in her left hand without improvement.  She elected to proceed with surgery after she failed conservative management and complications to include bleeding, infection, scarring, nerve/blood vessel/tendon damage, need for further surgery, failed surgery, failure to improve, stiffness,  and possible recurrence were discussed.  She signed a consent.    PROCEDURE IN DETAIL:  The patient was brought to the operating room and was transferred to the operating room bed where all bony prominences were well padded. General anesthesia was administered  by the Anesthesiology Department.  After general anesthesia was administered a tourniquet was applied to the upper part of the patient's left upper extremity.  The patient's left upper extremity was then prepped with chlorhexidine solution and draped in the normal sterile fashion.  After prepping and draping bony and soft tissue landmarks were palpated and a longitudinal medial incision was drawn at the patient's left elbow and a volar ulnar transverse incision was drawn at the patient's left wrist just proximal to the glabrous skin. The patient's arm was then elevated, exsanguinated, and tourniquet was inflated.       Sharp incision was made at the medial elbow with a #15 blade and dissection was taken to the level of the ulnar nerve while protecting vital structures.  The ulnar nerve was identified and then freed from investing tissues with tenotomy scissors while protecting vital structures.  After the ulnar nerve was freed it was further inspected and some hourglassing of the ulnar nerve was noted. A moist Ray-Trinity was then placed in the incision.       Attention was then placed at the patient's left wrist were sharp incision was made with a #15 blade at the previously marked wrist incision site. Dissection was taken to the level of the brachioradial fascia. An incision in the brachioradial fascia at the more radial extent of the skin incision was made with a #15 blade a spatula was placed in the brachioradial fascia incision and tenosynovium was freed from the undersurface.  A 2nd incision approximately 0.8 cm ulnar to the 1st brachioradial fascia incision was made with a #15 blade. The 2 incisions were then connected forming a window.  The window was retracted with a skin hook.  The proximal brachioradial fascia was then freed and released with the tenotomy scissors.  Attention was then placed in the carpal canal where the spatula was placed and the tenosynovium was freed from the undersurface of the  transcarpal ligament with a spatula while feeling the washboard effect .  The spatula was removed and successive hamate Finders were placed followed by the co equal. The coequal was then removed and a camera/knife assembly was placed in the wrist and advanced to the distal extent of the transcarpal ligament. The distal extent of the transcarpal ligament was identified and the knife blade was raised and a half release was completed.  The knife blade was retracted and the camera/knife assembly was then redirected distally. The knife blade was then raised again and a 2nd pass was completed completing the full release of the transcarpal ligament. The release was then inspected and only 1 leaf of the released transcarpal ligament could be seen in the operative field at the the same time. The camera/knife assembly was then removed from the patient's wrist and the previously formed window was released. A Ragnell was then placed in the incision and a full release was ensured.       The wrist incision was then copiously irrigated and the patient's wrist was elevated and the tourniquet was released. After several minutes the wrist incision was inspected and full hemostasis was ensured.  The wrist incision was then closed with nylon suture in a horizontal mattress type of fashion.       Attention was then placed at the elbow incision where the previously placed Ray-Trinity was removed.  Full hemostasis was ensured.  The incision was then closed in layers with Vicryl suture with final plastic closure. The elbow incision was then dressed with Mastisol, Steri-Strips, Adaptic, sterile gauze and sterile cast padding.       Wrist incision was then dressed with Adaptic, sterile gauze, and sterile cast padding.  A long-arm posterior mold plaster splint was then placed followed by an Ace wrap.       The patient was then awakened by anesthesia and was transferred from the operating room to the recovery room in stable condition.  The  patient tolerated the procedure well without complications.

## 2019-12-10 NOTE — ANESTHESIA POSTPROCEDURE EVALUATION
Anesthesia Post Evaluation    Patient: Amanda Wood    Procedure(s) Performed: Procedure(s) (LRB):  RELEASE, CARPAL TUNNEL, ENDOSCOPIC (Left)  RELEASE, CARPAL TUNNEL (Left)  DECOMPRESSION, NERVE, ULNAR (Left)    Final Anesthesia Type: general    Patient location during evaluation: PACU  Patient participation: Yes- Able to Participate  Level of consciousness: awake and awake and alert  Post-procedure vital signs: reviewed and stable  Pain management: adequate  Airway patency: patent    PONV status at discharge: No PONV  Anesthetic complications: no      Cardiovascular status: blood pressure returned to baseline  Respiratory status: unassisted and spontaneous ventilation  Hydration status: euvolemic  Follow-up not needed.          Vitals Value Taken Time   /68 12/10/2019 12:21 PM   Temp 36.6 °C (97.8 °F) 12/10/2019 12:15 PM   Pulse 107 12/10/2019 12:23 PM   Resp 21 12/10/2019 12:23 PM   SpO2 92 % 12/10/2019 12:23 PM   Vitals shown include unvalidated device data.      No case tracking events are documented in the log.      Pain/Karel Score: Karel Score: 8 (12/10/2019 12:20 PM)

## 2019-12-10 NOTE — DISCHARGE SUMMARY
Ms. Wood was admitted through same-day surgery was brought to the operating room where an endoscopic carpal tunnel release and ulnar nerve decompression was completed on her left upper extremity.  For full account of surgery please see the operative report.  Postoperatively the patient was transferred from the operating room to the recovery room and when alert awake and oriented was discharged home in stable condition with instructions to follow up in 10-12 days.

## 2019-12-10 NOTE — INTERVAL H&P NOTE
The patient has been examined and the H&P has been reviewed:  Operative extremity signed at 10:45 a.m..  H&P updated at 10:55 a.m..  Patient ready to roll to operating room at 10:55 a.m..    I concur with the findings and no changes have occurred since H&P was written.    Anesthesia/Surgery risks, benefits and alternative options discussed and understood by patient/family.          Active Hospital Problems    Diagnosis  POA    Carpal tunnel syndrome [G56.00]  Yes      Resolved Hospital Problems   No resolved problems to display.

## 2019-12-10 NOTE — PLAN OF CARE
Pt states she feels better nausea and pain subsided. Pt verbalizes understanding of discharge instructions.

## 2019-12-10 NOTE — TRANSFER OF CARE
"Anesthesia Transfer of Care Note    Patient: Amanda Wood    Procedure(s) Performed: Procedure(s) (LRB):  RELEASE, CARPAL TUNNEL, ENDOSCOPIC (Left)  RELEASE, CARPAL TUNNEL (Left)  DECOMPRESSION, NERVE, ULNAR (Left)    Patient location: PACU    Anesthesia Type: general    Transport from OR: Transported from OR on room air with adequate spontaneous ventilation    Post pain: adequate analgesia    Post assessment: no apparent anesthetic complications and tolerated procedure well    Post vital signs: stable    Level of consciousness: awake, alert and oriented    Nausea/Vomiting: no nausea/vomiting    Complications: none    Transfer of care protocol was followed      Last vitals:   Visit Vitals  /84 (BP Location: Right arm, Patient Position: Lying)   Pulse 64   Temp 36.9 °C (98.5 °F) (Oral)   Resp 20   Ht 5' 3" (1.6 m)   Wt 61.2 kg (135 lb)   SpO2 99%   Breastfeeding? No   BMI 23.91 kg/m²     "

## 2019-12-11 ENCOUNTER — TELEPHONE (OUTPATIENT)
Dept: ORTHOPEDICS | Facility: CLINIC | Age: 54
End: 2019-12-11

## 2019-12-20 ENCOUNTER — OFFICE VISIT (OUTPATIENT)
Dept: ORTHOPEDICS | Facility: CLINIC | Age: 54
End: 2019-12-20

## 2019-12-20 VITALS
HEART RATE: 81 BPM | SYSTOLIC BLOOD PRESSURE: 127 MMHG | WEIGHT: 135 LBS | HEIGHT: 63 IN | BODY MASS INDEX: 23.92 KG/M2 | DIASTOLIC BLOOD PRESSURE: 81 MMHG

## 2019-12-20 DIAGNOSIS — Z51.89 AFTER CARE: ICD-10-CM

## 2019-12-20 DIAGNOSIS — Z48.02 ENCOUNTER FOR REMOVAL OF SUTURES: Primary | ICD-10-CM

## 2019-12-20 DIAGNOSIS — Z98.890 S/P CARPAL TUNNEL RELEASE: Primary | ICD-10-CM

## 2019-12-20 PROCEDURE — 99999 PR PBB SHADOW E&M-EST. PATIENT-LVL III: CPT | Mod: PBBFAC,,, | Performed by: ORTHOPAEDIC SURGERY

## 2019-12-20 PROCEDURE — 99999 PR PBB SHADOW E&M-EST. PATIENT-LVL III: ICD-10-PCS | Mod: PBBFAC,,, | Performed by: ORTHOPAEDIC SURGERY

## 2019-12-20 PROCEDURE — 99024 POSTOP FOLLOW-UP VISIT: CPT | Mod: ,,, | Performed by: ORTHOPAEDIC SURGERY

## 2019-12-20 PROCEDURE — 99024 PR POST-OP FOLLOW-UP VISIT: ICD-10-PCS | Mod: ,,, | Performed by: ORTHOPAEDIC SURGERY

## 2019-12-20 PROCEDURE — 99213 OFFICE O/P EST LOW 20 MIN: CPT | Mod: PBBFAC,PN | Performed by: ORTHOPAEDIC SURGERY

## 2019-12-20 NOTE — PROGRESS NOTES
Subjective:      Patient ID: Amanda Wood is a 54 y.o. female.    Chief Complaint: Post-op Evaluation (Left wrist CTR 12/10 )      HPI: Ms. Wood returned today for her 1st postop visit on endoscopic left carpal tunnel release and ulnar nerve decompression of her left elbow.  She stated her symptoms had near completely resolved he is no longer number tingling in her fingers.  She had some pain in her elbow but otherwise is doing good.  Her date of surgery was 12/10/2019.    ROS:  New diagnosis/surgery/prescriptions since last office visit on 11/22/2019:  Endoscopic left carpal tunnel release and ulnar nerve decompression left elbow.      Objective:      Physical Exam:   General: AAOx3.  No acute distress  Vascular:  Pulses intact and equal bilaterally.  Capillary refill less than 3 seconds and equal bilaterally  Neurologic:  Pinprick and soft touch intact and equal bilaterally.  Tinel's negative bilateral wrist and elbow.  Phalen's negative bilaterally.  Elbow flexion test negative bilaterally.  Integment:  No ecchymosis, no errythema.  Incisions well approximated with sutures in place.  Extremity:  Wrist:  Pronation/supination equal bilaterally.  Dorsiflexion/volar flexion equal bilaterally.  strength equal bilaterally 5/5.  Relatively nontender with motion.  Relatively nontender with palpation.  Good finger motion equal bilaterally.                      Elbow:  Pronation/supination equal bilaterally.  Extension/flexion equal bilaterally.  Mild swelling medial aspect left elbow.  Relatively nontender with elbow motion.  Relatively nontender with elbow palpation.  Radial/ulna stressing equal bilaterally.  Radiography:  No new x-rays done today.      Assessment:       Impression:     1.  2.  3. Endoscopic carpal tunnel release, left wrist  Ulnar nerve decompression, left elbow.  Encounter for removal of sutures          Plan:       1.  Discussed physical examination with the patient. Amanda understands that  she had an endoscopic carpal tunnel release and an ulnar nerve decompression of her left upper extremity.  Since she is doing well she can begin to return to full activities.  2.  Since she does have some swelling in her elbow will refer to occupational therapy to start motion in pain reduction exercises.  3.  Any minor pain can be treated with over-the-counter medications dosed per box instructions.  4.  In 2 weeks can return to full unrestricted activities.  5.  Home exercises to include elbow and wrist motion exercises were discussed.  6.  Remove sutures and place Steri-Strips.  7.  [facility] portal was discussed with the patient and information was given.  The patient was encouraged to use the portal for future encounters.  8.  Follow up p.r.n..

## 2020-01-13 ENCOUNTER — CLINICAL SUPPORT (OUTPATIENT)
Dept: REHABILITATION | Facility: HOSPITAL | Age: 55
End: 2020-01-13
Attending: ORTHOPAEDIC SURGERY

## 2020-01-13 DIAGNOSIS — Z98.890 S/P CARPAL TUNNEL RELEASE: Primary | ICD-10-CM

## 2020-01-13 DIAGNOSIS — Z98.890 S/P DECOMPRESSION OF ULNAR NERVE AT ELBOW: ICD-10-CM

## 2020-01-13 PROCEDURE — 97010 HOT OR COLD PACKS THERAPY: CPT

## 2020-01-13 PROCEDURE — 97124 MASSAGE THERAPY: CPT

## 2020-01-13 PROCEDURE — 97165 OT EVAL LOW COMPLEX 30 MIN: CPT

## 2020-01-13 NOTE — PLAN OF CARE
[facility]  Occupational Therapy Initial Evaluation    Name: Amanda Wood  Clinic Number: [id number]    Therapy Diagnosis:   Encounter Diagnoses   Name Primary?    S/P carpal tunnel release Yes    S/P decompression of ulnar nerve at elbow      Physician: Kiet Simmons DO    Physician Orders: OT Jairo and Treat   Medical Diagnosis from Referral: MALLORY TORRES  Evaluation Date: 1/13/2020  Authorization Period Expiration: 02/21/2020  Plan of Care Expiration: 02/21/2020  Visit # / Visits authorized: 1    Time In: 800  Time Out: 850  Total Billable Time: 45 minutes    Precautions: Standard    Subjective   Date of onset: 12/10/2019  History of current condition - Amanda reports: undergoing surgery on her L elbow and L wrist for ulnar nerve decompression and carpal tunnel release on 12/10/2019.     Medical History:   Past Medical History:   Diagnosis Date    Hashimoto's thyroiditis     Hay fever     Hypoglycemia     Thyroid disease        Surgical History:   Amanda Wood  has a past surgical history that includes Laparoscopic cholecystectomy; Thyroidectomy (Bilateral); Tonsillectomy; Wrist reconstruction (Left); Mole removal; Patella surgery (Left); Endoscopic carpal tunnel release (Right, 10/15/2019); Ulnar tunnel release (Right, 10/15/2019); Endoscopic carpal tunnel release (Left, 12/10/2019); and Carpal tunnel release (Left, 12/10/2019).    Medications:   Amanda has a current medication list which includes the following prescription(s): levothyroxine.    Allergies:   Review of patient's allergies indicates:  No Known Allergies     Imaging, MRI studies: reviewed    Prior Therapy: yes, for RUE  Social History: lives alone  Occupation: unemployed   Prior Level of Function: independent  Current Level of Function: modified independent    Pain:  Current 3/10,  Location: left elbow    Description: Dull  Aggravating Factors: Night Time  Easing Factors: rest    Pts goals:   Return to  full function    Objective      Patient seen by OT for consult secondary to L carpal tunnel release and L ulnar nerve decompression on 12/10/2019. Patient is familiar to me as I treated her for her RUE prior to this surgery. Patient demonstrates ROM WNL and strength WFL LUE. Patient has  strength of 3+/5. Patient's incisions are nicely healed.       TREATMENT   Treatment Time In: 825  Treatment Time Out: 850  Total Treatment time separate from Evaluation: 25 minutes    Amanda received hot pack for 15 minutes to L elbow and L wrist.    Amanda received the following massage therapy techniques: scar massage applied to the: L elbow and L wrist for 12 minutes.      Home Exercises and Patient Education Provided    Education provided:   - HEP    Written Home Exercises Provided: yes.  Exercises were reviewed and Amanda was able to demonstrate them prior to the end of the session.  Amanda demonstrated good  understanding of the education provided.     See EMR under verbal for exercises provided verbal-prior visits from previous UE carpal tunnel release and ulnar nerve decompression.    Assessment   Amanda is a 54 y.o. female referred to outpatient Occupational Therapy with a medical diagnosis of LUE weakness. Pt presents with muscle weakness    Pt prognosis is Good.   Pt will benefit from skilled outpatient Occupational Therapy to address the deficits stated above and in the chart below, provide pt/family education, and to maximize pt's level of independence.     Plan of care discussed with patient: Yes  Pt's spiritual, cultural and educational needs considered and patient is agreeable to the plan of care and goals as stated below:     Anticipated Barriers for therapy: none      Goals:  Patient will tolerate multiple modalities LUE to improve function and decrease discomfort.   Patient will increase L  strength to 4+/5 within 4 weeks.   Patient will demonstrate independence in HEP at discharge.    Plan   Plan of care  Certification: 1/13/2020 to 02/21/2020    Outpatient Occupational Therapy 2 times weekly for 4 weeks to include the following interventions: Moist Heat/ Ice, Patient Education, Therapeutic Exercise, Ultrasound and scar massage.     Heidy Gamez, OT   Signature of Therapist    I certify the need for these services furnished under this plan of treatment and while under my care.______________________________                           Physician/Referring Practitioner  Date of Signature:

## 2020-01-13 NOTE — PROGRESS NOTES
[facility]  Occupational Therapy Initial Evaluation    Name: Amanda Wood  Clinic Number: [id number]    Therapy Diagnosis:   Encounter Diagnoses   Name Primary?    S/P carpal tunnel release Yes    S/P decompression of ulnar nerve at elbow      Physician: Kiet Simmons DO    Physician Orders: OT Jairo and Treat   Medical Diagnosis from Referral: MALLORY TORRES  Evaluation Date: 1/13/2020  Authorization Period Expiration: 02/21/2020  Plan of Care Expiration: 02/21/2020  Visit # / Visits authorized: 1    Time In: 800  Time Out: 850  Total Billable Time: 45 minutes    Precautions: Standard    Subjective   Date of onset: 12/10/2019  History of current condition - Amanda reports: undergoing surgery on her L elbow and L wrist for ulnar nerve decompression and carpal tunnel release on 12/10/2019.     Medical History:   Past Medical History:   Diagnosis Date    Hashimoto's thyroiditis     Hay fever     Hypoglycemia     Thyroid disease        Surgical History:   Amanda Wood  has a past surgical history that includes Laparoscopic cholecystectomy; Thyroidectomy (Bilateral); Tonsillectomy; Wrist reconstruction (Left); Mole removal; Patella surgery (Left); Endoscopic carpal tunnel release (Right, 10/15/2019); Ulnar tunnel release (Right, 10/15/2019); Endoscopic carpal tunnel release (Left, 12/10/2019); and Carpal tunnel release (Left, 12/10/2019).    Medications:   Amanda has a current medication list which includes the following prescription(s): levothyroxine.    Allergies:   Review of patient's allergies indicates:  No Known Allergies     Imaging, MRI studies: reviewed    Prior Therapy: yes, for RUE  Social History: lives alone  Occupation: unemployed   Prior Level of Function: independent  Current Level of Function: modified independent    Pain:  Current 3/10,  Location: left elbow    Description: Dull  Aggravating Factors: Night Time  Easing Factors: rest    Pts goals:   Return to  full function    Objective      Patient seen by OT for consult secondary to L carpal tunnel release and L ulnar nerve decompression on 12/10/2019. Patient is familiar to me as I treated her for her RUE prior to this surgery. Patient demonstrates ROM WNL and strength WFL LUE. Patient has  strength of 3+/5. Patient's incisions are nicely healed.       TREATMENT   Treatment Time In: 825  Treatment Time Out: 850  Total Treatment time separate from Evaluation: 25 minutes    Amanda received hot pack for 15 minutes to L elbow and L wrist.    Amanda received the following manual therapy techniques: scar massage applied to the: L elbow and L wrist for 12 minutes.      Home Exercises and Patient Education Provided    Education provided:   - HEP    Written Home Exercises Provided: yes.  Exercises were reviewed and Amanda was able to demonstrate them prior to the end of the session.  Amanda demonstrated good  understanding of the education provided.     See EMR under verbal for exercises provided verbal-prior visits from previous UE carpal tunnel release and ulnar nerve decompression.    Assessment   Amanda is a 54 y.o. female referred to outpatient Occupational Therapy with a medical diagnosis of LUE weakness. Pt presents with muscle weakness    Pt prognosis is Good.   Pt will benefit from skilled outpatient Occupational Therapy to address the deficits stated above and in the chart below, provide pt/family education, and to maximize pt's level of independence.     Plan of care discussed with patient: Yes  Pt's spiritual, cultural and educational needs considered and patient is agreeable to the plan of care and goals as stated below:     Anticipated Barriers for therapy: none      Goals:  Patient will tolerate multiple modalities LUE to improve function and decrease discomfort.   Patient will increase L  strength to 4+/5 within 4 weeks.   Patient will demonstrate independence in HEP at discharge.    Plan   Plan of care  Certification: 1/13/2020 to 02/21/2020    Outpatient Occupational Therapy 2 times weekly for 4 weeks to include the following interventions: Moist Heat/ Ice, Patient Education, Therapeutic Exercise, Ultrasound and scar massage.     Heidy Gamez, OT   Signature of Therapist    I certify the need for these services furnished under this plan of treatment and while under my care.______________________________                           Physician/Referring Practitioner  Date of Signature:

## 2020-01-15 ENCOUNTER — CLINICAL SUPPORT (OUTPATIENT)
Dept: REHABILITATION | Facility: HOSPITAL | Age: 55
End: 2020-01-15
Attending: ORTHOPAEDIC SURGERY

## 2020-01-15 DIAGNOSIS — Z98.890 S/P DECOMPRESSION OF ULNAR NERVE AT ELBOW: ICD-10-CM

## 2020-01-15 DIAGNOSIS — Z98.890 S/P CARPAL TUNNEL RELEASE: Primary | ICD-10-CM

## 2020-01-15 PROCEDURE — 97010 HOT OR COLD PACKS THERAPY: CPT

## 2020-01-15 PROCEDURE — 97124 MASSAGE THERAPY: CPT

## 2020-01-15 PROCEDURE — 97110 THERAPEUTIC EXERCISES: CPT

## 2020-01-15 PROCEDURE — 97035 APP MDLTY 1+ULTRASOUND EA 15: CPT

## 2020-01-15 NOTE — PROGRESS NOTES
Occupational Therapy Daily Treatment Note     Name: Amanda Green [facility] Number: [id number]    Therapy Diagnosis:   Encounter Diagnoses   Name Primary?    S/P carpal tunnel release Yes    S/P decompression of ulnar nerve at elbow      Physician: Kiet Simmons DO    Visit Date: 1/15/2020    Physician Orders: OT MELISSA  Medical Diagnosis: s/p L carpal tunnel release and s/p L ulnar nerve decompression  Evaluation Date: 01/13/2020  Authorization Period Expiration: 02/21/2020  Plan of Care Certification Period: 02/21/2020  Visit #/Visits authorized: 2    Time In: 845  Time Out: 945  Total Billable Time: 60 minutes    Precautions: Standard; No heavy lifting LUE    Subjective     Pt reports: having some aches and pain LUE  She was compliant with home exercise program.  Response to previous treatment: no issues from evaluation two days ago  Functional change: NA    Pain: 2/10  Location: left elbow and wrist      Objective     Amanda received hot pack for 15 minutes to L elbow and L wrist.    Amanda received therapeutic exercises to develop strength, ROM and flexibility including:  Light resist digi flex L hand x 25  Light resist digi extension L hand x 25  Passive stretch in L wrist ext and flex x 2 each @10 seconds each  1# dumbbell L wrist ext, flex against gravity, radial/ulnar dev against gravity, supination/pronation x 15 each   Clothespin tree performed LUE completing all resist levels    Amanda received the following manual therapy techniques: scar massage applied to the: L elow and L wrist for 12 minutes.    Amanda received the following direct contact modalities after being cleared for contraindications: Ultrasound:  Amanda received ultrasound to manage pain and inflammation at 50 % duty cycle applied to the L elbow and L wrist at an intensity of  number entry box 2.0 W/cm2 at 3.3MHz for a duration of 10 minutes. Patient tolerated treatment well without adverse effects. Therapist was in attendance  throughout intervention.      Home Exercises Provided and Patient Education Provided     Education provided:   - HEP    Written Home Exercises Provided: verbal.  Exercises were reviewed and Amanda was able to demonstrate them prior to the end of the session.  Amanda demonstrated verbal from prior episode on RUE understanding of the education provided.     See EMR under verbal for exercises provided patient was seen in prior episode regarding same procedures on RUE. She verbalized understanding of continuing same program at home with LUE.    Assessment     Amanda is progressing well towards her goals.   Pt prognosis is Good.     Pt will continue to benefit from skilled outpatient occupational therapy to address the deficits listed in the problem list box on initial evaluation, provide pt/family education and to maximize pt's level of independence in the home and community environment.     Pt's spiritual, cultural and educational needs considered and pt agreeable to plan of care and goals.     Anticipated barriers to occupational therapy: none    Goals:   Patient will tolerate multiple modalities LUE to improve function and decrease discomfort.   Patient will increase L  strength to 4+/5 within 4 weeks.   Patient will demonstrate independence in HEP at discharge.    Plan     Continue OT per established POC.    Heidy Gamez, OT

## 2020-01-21 ENCOUNTER — CLINICAL SUPPORT (OUTPATIENT)
Dept: REHABILITATION | Facility: HOSPITAL | Age: 55
End: 2020-01-21
Attending: ORTHOPAEDIC SURGERY

## 2020-01-21 DIAGNOSIS — Z98.890 S/P CARPAL TUNNEL RELEASE: Primary | ICD-10-CM

## 2020-01-21 DIAGNOSIS — Z98.890 S/P DECOMPRESSION OF ULNAR NERVE AT ELBOW: ICD-10-CM

## 2020-01-21 PROCEDURE — 97124 MASSAGE THERAPY: CPT

## 2020-01-21 PROCEDURE — 97010 HOT OR COLD PACKS THERAPY: CPT

## 2020-01-21 PROCEDURE — 97110 THERAPEUTIC EXERCISES: CPT

## 2020-01-21 PROCEDURE — 97035 APP MDLTY 1+ULTRASOUND EA 15: CPT

## 2020-01-21 NOTE — PROGRESS NOTES
Occupational Therapy Daily Treatment Note     Name: Amanda Green [facility] Number: [id number]    Therapy Diagnosis:   Encounter Diagnoses   Name Primary?    S/P carpal tunnel release Yes    S/P decompression of ulnar nerve at elbow      Physician: Kiet Simmons DO    Visit Date: 1/21/2020    Physician Orders: MALLORY TORRES  Medical Diagnosis: s/p L carpal tunnel release and s/p L ulnar nerve decompression  Evaluation Date: 01/13/2020  Authorization Period Expiration: 02/21/2020  Plan of Care Certification Period: 02/21/2020  Visit #/Visits authorized: 3    Time In: 200  Time Out: 300  Total Billable Time: 60 minutes    Precautions: Standard; No heavy lifting LUE    Subjective     Pt reports: she feels she is doing well  She was compliant with home exercise program.  Response to previous treatment: no issues from evaluation two days ago  Functional change: NA    Pain: 0/10  Location: left elbow and wrist      Objective     Amanda received hot pack for 15 minutes to L elbow and L wrist.    Amanda received therapeutic exercises to develop strength, ROM and flexibility including:  Light resist digi flex L hand x 25  Light resist digi extension L hand x 25  1# dumbbell L wrist ext, flex against gravity, radial/ulnar dev against gravity, supination/pronation x 15 each     Amanda received the following manual therapy techniques: scar massage applied to the: L elow and L wrist for 12 minutes.    Amanda received the following direct contact modalities after being cleared for contraindications: Ultrasound:  Amanda received ultrasound to manage pain and inflammation at 50 % duty cycle applied to the L elbow and L wrist at an intensity of  number entry box 2.0 W/cm2 at 3.3MHz for a duration of 10 minutes. Patient tolerated treatment well without adverse effects. Therapist was in attendance throughout intervention.      Home Exercises Provided and Patient Education Provided     Education provided:   - HEP    Written Home  Exercises Provided: verbal.  Exercises were reviewed and Amanda was able to demonstrate them prior to the end of the session.  Amanda demonstrated verbal from prior episode on RUE understanding of the education provided.     See EMR under verbal for exercises provided patient was seen in prior episode regarding same procedures on RUE. She verbalized understanding of continuing same program at home with LUE.    Assessment     Amanda is progressing well towards her goals. She is on track and progressing as anticipated.  Pt prognosis is Good.     Pt will continue to benefit from skilled outpatient occupational therapy to address the deficits listed in the problem list box on initial evaluation, provide pt/family education and to maximize pt's level of independence in the home and community environment.     Pt's spiritual, cultural and educational needs considered and pt agreeable to plan of care and goals.     Anticipated barriers to occupational therapy: none    Goals:   Patient will tolerate multiple modalities LUE to improve function and decrease discomfort.   Patient will increase L  strength to 4+/5 within 4 weeks.   Patient will demonstrate independence in HEP at discharge.    Plan     Continue OT per established POC.    Heidy Gamez, OT

## 2021-06-13 ENCOUNTER — HOSPITAL ENCOUNTER (EMERGENCY)
Facility: HOSPITAL | Age: 56
Discharge: HOME | End: 2021-06-13
Attending: EMERGENCY MEDICINE
Payer: OTHER GOVERNMENT

## 2021-06-13 VITALS
HEIGHT: 65 IN | SYSTOLIC BLOOD PRESSURE: 133 MMHG | OXYGEN SATURATION: 95 % | HEART RATE: 102 BPM | DIASTOLIC BLOOD PRESSURE: 92 MMHG | WEIGHT: 135 LBS | BODY MASS INDEX: 22.49 KG/M2 | RESPIRATION RATE: 17 BRPM | TEMPERATURE: 100 F

## 2021-06-13 DIAGNOSIS — R10.9 ABDOMINAL PAIN: ICD-10-CM

## 2021-06-13 DIAGNOSIS — B34.9 VIRAL SYNDROME: Primary | ICD-10-CM

## 2021-06-13 DIAGNOSIS — N39.0 URINARY TRACT INFECTION WITHOUT HEMATURIA, SITE UNSPECIFIED: ICD-10-CM

## 2021-06-13 DIAGNOSIS — R11.2 NON-INTRACTABLE VOMITING WITH NAUSEA, UNSPECIFIED VOMITING TYPE: ICD-10-CM

## 2021-06-13 LAB
ALBUMIN SERPL BCP-MCNC: 3.6 G/DL (ref 3.5–5.2)
ALP SERPL-CCNC: 102 U/L (ref 55–135)
ALT SERPL W/O P-5'-P-CCNC: 173 U/L (ref 10–44)
ANION GAP SERPL CALC-SCNC: 14 MMOL/L (ref 8–16)
AST SERPL-CCNC: 145 U/L (ref 10–40)
BACTERIA #/AREA URNS HPF: ABNORMAL /HPF
BASOPHILS # BLD AUTO: 0.02 K/UL (ref 0–0.2)
BASOPHILS NFR BLD: 0.4 % (ref 0–1.9)
BILIRUB SERPL-MCNC: 0.4 MG/DL (ref 0.1–1)
BILIRUB UR QL STRIP: ABNORMAL
BNP SERPL-MCNC: 13 PG/ML (ref 0–99)
BUN SERPL-MCNC: 8 MG/DL (ref 6–20)
CALCIUM SERPL-MCNC: 8.4 MG/DL (ref 8.7–10.5)
CHLORIDE SERPL-SCNC: 98 MMOL/L (ref 95–110)
CLARITY UR: ABNORMAL
CO2 SERPL-SCNC: 22 MMOL/L (ref 23–29)
COLOR UR: YELLOW
CREAT SERPL-MCNC: 0.7 MG/DL (ref 0.5–1.4)
DIFFERENTIAL METHOD: ABNORMAL
EOSINOPHIL # BLD AUTO: 0 K/UL (ref 0–0.5)
EOSINOPHIL NFR BLD: 0 % (ref 0–8)
ERYTHROCYTE [DISTWIDTH] IN BLOOD BY AUTOMATED COUNT: 11.8 % (ref 11.5–14.5)
EST. GFR  (AFRICAN AMERICAN): >60 ML/MIN/1.73 M^2
EST. GFR  (NON AFRICAN AMERICAN): >60 ML/MIN/1.73 M^2
GLUCOSE SERPL-MCNC: 121 MG/DL (ref 70–110)
GLUCOSE UR QL STRIP: NEGATIVE
HCT VFR BLD AUTO: 42.9 % (ref 37–48.5)
HGB BLD-MCNC: 15.3 G/DL (ref 12–16)
HGB UR QL STRIP: ABNORMAL
HYALINE CASTS #/AREA URNS LPF: ABNORMAL /LPF
IMM GRANULOCYTES # BLD AUTO: 0.02 K/UL (ref 0–0.04)
IMM GRANULOCYTES NFR BLD AUTO: 0.4 % (ref 0–0.5)
KETONES UR QL STRIP: ABNORMAL
LACTATE SERPL-SCNC: 1.1 MMOL/L (ref 0.5–2.2)
LEUKOCYTE ESTERASE UR QL STRIP: NEGATIVE
LIPASE SERPL-CCNC: 21 U/L (ref 4–60)
LYMPHOCYTES # BLD AUTO: 1.7 K/UL (ref 1–4.8)
LYMPHOCYTES NFR BLD: 34.4 % (ref 18–48)
MCH RBC QN AUTO: 34.2 PG (ref 27–31)
MCHC RBC AUTO-ENTMCNC: 35.7 G/DL (ref 32–36)
MCV RBC AUTO: 96 FL (ref 82–98)
MICROSCOPIC COMMENT: ABNORMAL
MONOCYTES # BLD AUTO: 0.4 K/UL (ref 0.3–1)
MONOCYTES NFR BLD: 7.6 % (ref 4–15)
NEUTROPHILS # BLD AUTO: 2.8 K/UL (ref 1.8–7.7)
NEUTROPHILS NFR BLD: 57.2 % (ref 38–73)
NITRITE UR QL STRIP: NEGATIVE
NON-SQ EPI CELLS #/AREA URNS HPF: 4 /HPF
NRBC BLD-RTO: 0 /100 WBC
PH UR STRIP: 6 [PH] (ref 5–8)
PLATELET # BLD AUTO: 206 K/UL (ref 150–450)
PLATELET BLD QL SMEAR: ABNORMAL
PMV BLD AUTO: 11.1 FL (ref 9.2–12.9)
POCT GLUCOSE: 148 MG/DL (ref 70–110)
POTASSIUM SERPL-SCNC: 3.3 MMOL/L (ref 3.5–5.1)
PROT SERPL-MCNC: 7.2 G/DL (ref 6–8.4)
PROT UR QL STRIP: ABNORMAL
RBC # BLD AUTO: 4.48 M/UL (ref 4–5.4)
RBC #/AREA URNS HPF: 9 /HPF (ref 0–4)
SARS-COV-2 RDRP RESP QL NAA+PROBE: NEGATIVE
SODIUM SERPL-SCNC: 134 MMOL/L (ref 136–145)
SP GR UR STRIP: 1.02 (ref 1–1.03)
SQUAMOUS #/AREA URNS HPF: 22 /HPF
TROPONIN I SERPL DL<=0.01 NG/ML-MCNC: <0.006 NG/ML (ref 0–0.03)
URN SPEC COLLECT METH UR: ABNORMAL
UROBILINOGEN UR STRIP-ACNC: 1 EU/DL
WBC # BLD AUTO: 4.89 K/UL (ref 3.9–12.7)
WBC #/AREA URNS HPF: 5 /HPF (ref 0–5)

## 2021-06-13 PROCEDURE — 96361 HYDRATE IV INFUSION ADD-ON: CPT

## 2021-06-13 PROCEDURE — 84484 ASSAY OF TROPONIN QUANT: CPT | Performed by: EMERGENCY MEDICINE

## 2021-06-13 PROCEDURE — 63600175 PHARM REV CODE 636 W HCPCS: Performed by: EMERGENCY MEDICINE

## 2021-06-13 PROCEDURE — 71045 X-RAY EXAM CHEST 1 VIEW: CPT | Mod: 26,,, | Performed by: RADIOLOGY

## 2021-06-13 PROCEDURE — 85025 COMPLETE CBC W/AUTO DIFF WBC: CPT | Performed by: EMERGENCY MEDICINE

## 2021-06-13 PROCEDURE — 83605 ASSAY OF LACTIC ACID: CPT | Performed by: EMERGENCY MEDICINE

## 2021-06-13 PROCEDURE — 80053 COMPREHEN METABOLIC PANEL: CPT | Performed by: EMERGENCY MEDICINE

## 2021-06-13 PROCEDURE — 81000 URINALYSIS NONAUTO W/SCOPE: CPT | Performed by: EMERGENCY MEDICINE

## 2021-06-13 PROCEDURE — 93005 ELECTROCARDIOGRAM TRACING: CPT

## 2021-06-13 PROCEDURE — 83880 ASSAY OF NATRIURETIC PEPTIDE: CPT | Performed by: EMERGENCY MEDICINE

## 2021-06-13 PROCEDURE — 25000003 PHARM REV CODE 250: Performed by: EMERGENCY MEDICINE

## 2021-06-13 PROCEDURE — 99285 EMERGENCY DEPT VISIT HI MDM: CPT | Mod: 25

## 2021-06-13 PROCEDURE — 83690 ASSAY OF LIPASE: CPT | Performed by: EMERGENCY MEDICINE

## 2021-06-13 PROCEDURE — 82962 GLUCOSE BLOOD TEST: CPT

## 2021-06-13 PROCEDURE — 87040 BLOOD CULTURE FOR BACTERIA: CPT | Performed by: EMERGENCY MEDICINE

## 2021-06-13 PROCEDURE — 71045 X-RAY EXAM CHEST 1 VIEW: CPT | Mod: TC,FY

## 2021-06-13 PROCEDURE — 96374 THER/PROPH/DIAG INJ IV PUSH: CPT

## 2021-06-13 PROCEDURE — 71045 XR CHEST AP PORTABLE: ICD-10-PCS | Mod: 26,,, | Performed by: RADIOLOGY

## 2021-06-13 PROCEDURE — U0002 COVID-19 LAB TEST NON-CDC: HCPCS | Performed by: EMERGENCY MEDICINE

## 2021-06-13 PROCEDURE — 36415 COLL VENOUS BLD VENIPUNCTURE: CPT | Performed by: EMERGENCY MEDICINE

## 2021-06-13 RX ORDER — ONDANSETRON 2 MG/ML
4 INJECTION INTRAMUSCULAR; INTRAVENOUS
Status: COMPLETED | OUTPATIENT
Start: 2021-06-13 | End: 2021-06-13

## 2021-06-13 RX ORDER — CEPHALEXIN 250 MG/1
250 CAPSULE ORAL 4 TIMES DAILY
Qty: 28 CAPSULE | Refills: 0 | Status: SHIPPED | OUTPATIENT
Start: 2021-06-13 | End: 2021-06-20

## 2021-06-13 RX ORDER — BENZONATATE 100 MG/1
200 CAPSULE ORAL 3 TIMES DAILY PRN
Qty: 20 CAPSULE | Refills: 0 | Status: SHIPPED | OUTPATIENT
Start: 2021-06-13 | End: 2021-06-23

## 2021-06-13 RX ORDER — ONDANSETRON 4 MG/1
4 TABLET, ORALLY DISINTEGRATING ORAL EVERY 6 HOURS PRN
Qty: 12 TABLET | Refills: 0 | Status: SHIPPED | OUTPATIENT
Start: 2021-06-13

## 2021-06-13 RX ADMIN — ONDANSETRON HYDROCHLORIDE 4 MG: 2 SOLUTION INTRAMUSCULAR; INTRAVENOUS at 06:06

## 2021-06-13 RX ADMIN — SODIUM CHLORIDE 1000 ML: 0.9 INJECTION, SOLUTION INTRAVENOUS at 06:06

## 2021-06-18 LAB — BACTERIA BLD CULT: NORMAL

## 2022-01-02 ENCOUNTER — HOSPITAL ENCOUNTER (EMERGENCY)
Facility: HOSPITAL | Age: 57
Discharge: HOME | End: 2022-01-02
Attending: EMERGENCY MEDICINE
Payer: COMMERCIAL

## 2022-01-02 VITALS
HEART RATE: 89 BPM | TEMPERATURE: 98 F | HEIGHT: 63 IN | SYSTOLIC BLOOD PRESSURE: 160 MMHG | RESPIRATION RATE: 18 BRPM | DIASTOLIC BLOOD PRESSURE: 89 MMHG | BODY MASS INDEX: 23.04 KG/M2 | OXYGEN SATURATION: 97 % | WEIGHT: 130 LBS

## 2022-01-02 DIAGNOSIS — M54.50 ACUTE RIGHT-SIDED LOW BACK PAIN, UNSPECIFIED WHETHER SCIATICA PRESENT: ICD-10-CM

## 2022-01-02 DIAGNOSIS — M62.838 MUSCLE SPASM OF RIGHT LEG: Primary | ICD-10-CM

## 2022-01-02 PROCEDURE — 99284 EMERGENCY DEPT VISIT MOD MDM: CPT

## 2022-01-02 RX ORDER — HYDROCODONE BITARTRATE AND ACETAMINOPHEN 7.5; 325 MG/1; MG/1
1 TABLET ORAL EVERY 8 HOURS PRN
Qty: 10 TABLET | Refills: 0 | Status: SHIPPED | OUTPATIENT
Start: 2022-01-02

## 2022-01-02 RX ORDER — DIAZEPAM 5 MG/1
5 TABLET ORAL EVERY 8 HOURS PRN
Qty: 6 TABLET | Refills: 0 | Status: SHIPPED | OUTPATIENT
Start: 2022-01-02 | End: 2022-02-01

## 2022-01-02 NOTE — ED PROVIDER NOTES
CHIEF COMPLAINT    Chief Complaint   Patient presents with    Leg Pain       HPI    Amanda Wood is a 56 y.o. female who presents complaining of right leg pain that started 5 days ago.  She says started is little bit of low back pain that radiated around to the right upper thigh.  She says now it is more intense and worse with certain positions.  Also some pain radiating down to the right calf.  She says she will have a severe cramp in the leg that will cause her to almost fall.  She did have a fall about a month ago when she was out dancing and landed on her buttock.  She had a hematoma and bruising to the right buttock that time.  She was sore but really denies any other problems from that.  Denies any significant numbness or weakness in the extremities.  She tried taking Motrin without much improvement.  Denies history of blood clots.  No recent surgeries or long trips.  The severity of the symptoms is moderate.    CURRENT MEDICATIONS    Prior to Admission medications    Medication Sig Start Date End Date Taking? Authorizing Provider   levothyroxine (SYNTHROID) 137 MCG Tab tablet TAKE 1 TABLET BY MOUTH ONCE DAILY 9/30/19   Historical Provider   ondansetron (ZOFRAN-ODT) 4 MG TbDL Take 1 tablet (4 mg total) by mouth every 6 (six) hours as needed (Nausea). 6/13/21   Tad Nunez MD       ALLERGIES    Review of patient's allergies indicates:   Allergen Reactions    Methylprednisolone acetate        PAST MEDICAL HISTORY  Past Medical History:   Diagnosis Date    Hashimoto's thyroiditis     Hay fever     Hypoglycemia     Thyroid disease        SURGICAL HISTORY    Past Surgical History:   Procedure Laterality Date    CARPAL TUNNEL RELEASE Left 12/10/2019    Procedure: RELEASE, CARPAL TUNNEL;  Surgeon: Kiet Simmons DO;  Location: [facility] OR;  Service: Orthopedics;  Laterality: Left;  Endoscopic VS Open Carpal Tunnel Release  Equipment: [employer]ire Endoscopic Carpal Tunnel Release System;  Scope  Vendor/Rep: [employer]    ENDOSCOPIC CARPAL TUNNEL RELEASE Right 10/15/2019    Procedure: RELEASE, CARPAL TUNNEL, ENDOSCOPIC;  Surgeon: Kiet Simmons DO;  Location: [facility] OR;  Service: Orthopedics;  Laterality: Right;  Arthroscopic VS Open Carpal Tunnel Release  Equipment: MicroAire Endoscopic Carpal Tunnel Release System  Vendor/Rep: [employer]    ENDOSCOPIC CARPAL TUNNEL RELEASE Left 12/10/2019    Procedure: RELEASE, CARPAL TUNNEL, ENDOSCOPIC;  Surgeon: Kiet Simmons DO;  Location: [facility] OR;  Service: Orthopedics;  Laterality: Left;  Endoscopic VS Open Carpal Tunnel Release  Equipment: Micro[employer]ire Endoscopic Carpal Tunnel Release System; Scope  Vendor/Rep: [employer]    LAPAROSCOPIC CHOLECYSTECTOMY      MOLE REMOVAL      PATELLA SURGERY Left     THYROIDECTOMY Bilateral     TONSILLECTOMY      ULNAR TUNNEL RELEASE Right 10/15/2019    Procedure: RELEASE, CUBITAL TUNNEL;  Surgeon: Kiet Simmons DO;  Location: [facility] OR;  Service: Orthopedics;  Laterality: Right;  Equipment: MicroAire Endoscopic Carpal Tunnel Release System  Vendor/Rep: [employer]    WRIST RECONSTRUCTION Left        SOCIAL HISTORY    Social History     Socioeconomic History    Marital status:    Tobacco Use    Smoking status: Current Every Day Smoker     Packs/day: 1.00     Years: 40.00     Pack years: 40.00     Types: Cigarettes    Smokeless tobacco: Never Used   Substance and Sexual Activity    Alcohol use: Yes     Comment: most days of the week-beer/wine    Drug use: Never    Sexual activity: Not Currently       FAMILY HISTORY    Family History   Problem Relation Age of Onset    Cancer Mother     Parkinsonism Father     Heart attack Father     Irritable bowel syndrome Sister     Parkinsonism Paternal Uncle        REVIEW OF SYSTEMS    Constitutional:  No reported fever, chills, weight loss or weakness.   Eyes:  No reported photophobia or discharge. No visual changes  HENT:  No reported sore throat or ear pain.  "  Respiratory:  No reported cough or shortness of breath.   Cardiovascular:  No reported chest pain, palpitations or swelling.   GI:  No reported abdominal pain, nausea, vomiting, or diarrhea.   Musculoskeletal:  See HPI  Skin:  No reported rash.   Neurologic:  No reported headache, focal weakness or sensory changes.   Endocrine:  No reported polyuria or polydypsia.   Lymphatic:  No reported swollen glands.   Psychiatric:  No reported depression, suicidal ideation or homicidal ideation.   All Systems otherwise negative except as noted in the Review of Systems and History of Present Illness.    PHYSICAL EXAM    VITAL SIGNS: BP (!) 160/89   Pulse 89   Temp 98.2 °F (36.8 °C) (Oral)   Resp 18   Ht 5' 3" (1.6 m)   Wt 59 kg (130 lb)   SpO2 97%   Breastfeeding No   BMI 23.03 kg/m²    Constitutional:  Well developed, Well nourished who appears stated age, No acute distress, Non-toxic appearance.   Respiratory: Breath sounds clear to auscultation bilaterally, No respiratory distress, No wheezing, No chest tenderness.   Cardiovascular:  Normal heart rate, Normal rhythm, No murmurs, No rubs, No gallops.   Musculoskeletal:  Intact distal pulses, No edema, No cyanosis, No clubbing. Good range of motion in all major joints. No major deformities noted.  Negative Homans sign bilaterally.  Back- No midline tenderness, step offs or deformities.  Mild-to-moderate tenderness in the right lower lumbar/SI joint and right piriformis.  Muscle spasm present along the proximal upper thigh, worse with extension of the leg.  Integument:  Warm, Dry, No erythema, No rash.   Lymphatic:  No lymphadenopathy noted.   Neurologic:  Alert & oriented x 3, Motor and sensory function grossly intact, No focal deficits noted.   Psychiatric:  Affect normal, Judgment normal, Mood anxious     LABS  Pertinent labs reviewed. (See chart for details)   Labs Reviewed - No data to display    RADIOLOGY    Imaging Results    None       ED COURSE & MEDICAL " DECISION MAKING    Medications - No data to display    The patient presents with the history as noted above. The differential diagnosis would include but is not limited to:  Lumbar strain, cauda equina, epidural abscess, sciatica     Patient presents with low back pain and then muscle spasm which is clinically evident on exam.  Patient continue Motrin.  Patient also very anxious.  Will discharge her short course of Valium and Norco, continue home stretches, drink plenty of fluids.  Follow up primary care next week.  ED return precautions given the patient.       FINAL IMPRESSION    1. Muscle spasm of right leg    2. Acute right-sided low back pain, unspecified whether sciatica present          Jaden Ya    The patient was discharged to home with the following prescriptions:   Discharge Medication List as of 1/2/2022  9:25 AM      START taking these medications    Details   diazePAM (VALIUM) 5 MG tablet Take 1 tablet (5 mg total) by mouth every 8 (eight) hours as needed (muscle spasm)., Starting Sun 1/2/2022, Until Tue 2/1/2022 at 2359, Print      HYDROcodone-acetaminophen (NORCO) 7.5-325 mg per tablet Take 1 tablet by mouth every 8 (eight) hours as needed for Pain., Starting Sun 1/2/2022, Print             I stressed the importance of close follow-up with:  [facility] Dept  [address] [place] [id number] [phone]    If symptoms worsen    Primary care provider    Schedule an appointment as soon as possible for a visit in 3 days        I discussed the differential diagnosis, treatment plan, and strict return precautions for any worsening symptoms or new concerning symptoms, and they stated understanding.        **Parts of this note were created using [employer] Voice Recognition software program. While efforts were made to correct any mistakes made by this voice recognition software program, nonsensical phrases may remain in this note.       Jaden Ya,  DO  01/02/22 0934

## 2022-01-02 NOTE — DISCHARGE INSTRUCTIONS
Continue over-the-counter Tylenol or ibuprofen as needed for mild-to-moderate pain.  Moist heat to the area several times a day to help with pain.

## 2022-01-02 NOTE — ED TRIAGE NOTES
Pt states that around thanksgiving and has been having pain in her right leg pain. Pt states that the pain starts in her right hip area that goes down her right leg. Pt states that she started having the pain again around wed but has since gotten worse. Pt states that the pain is a sharp stabbing pain

## 2022-04-28 DIAGNOSIS — M79.631 PAIN OF RIGHT FOREARM: Primary | ICD-10-CM

## 2022-04-29 ENCOUNTER — HOSPITAL ENCOUNTER (OUTPATIENT)
Dept: RADIOLOGY | Facility: HOSPITAL | Age: 57
Discharge: HOME | End: 2022-04-29
Attending: ORTHOPAEDIC SURGERY
Payer: COMMERCIAL

## 2022-04-29 ENCOUNTER — OFFICE VISIT (OUTPATIENT)
Dept: ORTHOPEDICS | Facility: CLINIC | Age: 57
End: 2022-04-29
Payer: COMMERCIAL

## 2022-04-29 VITALS — WEIGHT: 130.06 LBS | HEIGHT: 63 IN | BODY MASS INDEX: 23.04 KG/M2 | RESPIRATION RATE: 16 BRPM

## 2022-04-29 DIAGNOSIS — M79.631 PAIN OF RIGHT FOREARM: ICD-10-CM

## 2022-04-29 DIAGNOSIS — M79.2 RADICULAR PAIN OF RIGHT UPPER EXTREMITY: Primary | ICD-10-CM

## 2022-04-29 PROCEDURE — 73090 XR FOREARM RIGHT: ICD-10-PCS | Mod: 26,RT,, | Performed by: RADIOLOGY

## 2022-04-29 PROCEDURE — 1159F PR MEDICATION LIST DOCUMENTED IN MEDICAL RECORD: ICD-10-PCS | Mod: CPTII,S$GLB,, | Performed by: ORTHOPAEDIC SURGERY

## 2022-04-29 PROCEDURE — 99999 PR PBB SHADOW E&M-EST. PATIENT-LVL III: ICD-10-PCS | Mod: PBBFAC,,, | Performed by: ORTHOPAEDIC SURGERY

## 2022-04-29 PROCEDURE — 99999 PR PBB SHADOW E&M-EST. PATIENT-LVL III: CPT | Mod: PBBFAC,,, | Performed by: ORTHOPAEDIC SURGERY

## 2022-04-29 PROCEDURE — 3008F BODY MASS INDEX DOCD: CPT | Mod: CPTII,S$GLB,, | Performed by: ORTHOPAEDIC SURGERY

## 2022-04-29 PROCEDURE — 1159F MED LIST DOCD IN RCRD: CPT | Mod: CPTII,S$GLB,, | Performed by: ORTHOPAEDIC SURGERY

## 2022-04-29 PROCEDURE — 99214 PR OFFICE/OUTPT VISIT, EST, LEVL IV, 30-39 MIN: ICD-10-PCS | Mod: S$GLB,,, | Performed by: ORTHOPAEDIC SURGERY

## 2022-04-29 PROCEDURE — 73090 X-RAY EXAM OF FOREARM: CPT | Mod: TC,PN,RT

## 2022-04-29 PROCEDURE — 3008F PR BODY MASS INDEX (BMI) DOCUMENTED: ICD-10-PCS | Mod: CPTII,S$GLB,, | Performed by: ORTHOPAEDIC SURGERY

## 2022-04-29 PROCEDURE — 4010F ACE/ARB THERAPY RXD/TAKEN: CPT | Mod: CPTII,S$GLB,, | Performed by: ORTHOPAEDIC SURGERY

## 2022-04-29 PROCEDURE — 99214 OFFICE O/P EST MOD 30 MIN: CPT | Mod: S$GLB,,, | Performed by: ORTHOPAEDIC SURGERY

## 2022-04-29 PROCEDURE — 4010F PR ACE/ARB THEARPY RXD/TAKEN: ICD-10-PCS | Mod: CPTII,S$GLB,, | Performed by: ORTHOPAEDIC SURGERY

## 2022-04-29 PROCEDURE — 73090 X-RAY EXAM OF FOREARM: CPT | Mod: 26,RT,, | Performed by: RADIOLOGY

## 2022-04-29 RX ORDER — LEVOTHYROXINE SODIUM 125 UG/1
125 TABLET ORAL
COMMUNITY

## 2022-04-29 RX ORDER — LISINOPRIL 10 MG/1
10 TABLET ORAL DAILY
COMMUNITY

## 2022-04-29 RX ORDER — DICLOFENAC SODIUM 50 MG/1
50 TABLET, DELAYED RELEASE ORAL 2 TIMES DAILY
COMMUNITY

## 2022-04-29 NOTE — PROGRESS NOTES
Subjective:      Patient ID: Amanda Wood is a 56 y.o. female.    Chief Complaint: Pain and Injury of the Right Forearm      HPI:  Ms. Wood returns today with new complaints of pain that radiates from her neck down her arm into her fingers causing numbness and tingling in the ulnar 2 fingers of her right hand.  She stated that her symptoms began on 04/02/2022 after she slipped on some isamar banging her arm into a doorjamb.  After about 2-3 days after injury she presented to a [facility] in [facility] for evaluation and was treated with Voltaren oral medications.  She stated that the medications have helped.  She is now dropping things and her symptoms awaken her at night.  She has not done recent physical therapy worn a brace, nor had injections.  Her pain is increasing and not improving.    ROS:  No new diagnosis/surgery/prescriptions since last office visit on 12/20/2019.  Constitution: Negative for chills and fever.   HENT: Negative for congestion.    Eyes: Negative for blurred vision.   Cardiovascular: Negative for chest pain.   Respiratory: Negative for cough.    Endocrine: Negative for polydipsia.   Hematologic/Lymphatic: Negative for adenopathy.   Skin: Negative for flushing, itching and skin cancer.   Musculoskeletal: Negative for gout.   Gastrointestinal: Negative for constipation, diarrhea and heartburn.   Genitourinary: Negative for nocturia.   Neurological: Positive for headaches and seizures.   Psychiatric/Behavioral: Positive for depression and substance abuse. The patient is nervous/anxious.    Allergic/Immunologic: Positive for environmental allergies.       Objective:      Physical Exam:   General: AAOx3.  No acute distress  Vascular:  Pulses intact and equal bilaterally.  Capillary refill less than 3 seconds and equal bilaterally  Neurologic:  Pinprick and soft touch intact and equal bilaterally.  Tinel's negative both elbows.  Tinel's negative both wrist.   Spurling's positive.  Integment:  No ecchymosis, no errythema  Extremity:  Wrist/hand.  Pronation/supination equal bilaterally 90/85 degrees.  Dorsiflexion/volar flexion equal bilaterally 75/70 degrees.  Relatively nontender with wrist motion.  Thenar and interosseous atrophy present.  Nontender in the anatomic snuffbox bilaterally.  Nontender at the 1st dorsal compartment bilaterally.  Nontender at the scapholunate interval bilaterally.  Finkelstein's negative bilaterally.  Brizuela's test negative bilaterally.  Ulnar impaction test negative bilaterally.  Negative Wartenberg sign bilaterally.  Good motion the patient's fingers.                      Elbow/forearm.  Pronation/supination equal bilaterally 90/85 degrees.  Extension/flexion equal bilaterally 0/128 degrees.  Nontender over the medial or lateral epicondyle.  Radial/ulna stressing equal bilaterally.  Nontender over the olecranon bilaterally.                       Shoulder:  Forward flexion/abduction equal bilaterally.  Full can negative bilaterally.  Empty can negative bilaterally.  Narvaez/Neer relatively negative bilaterally.  Cross-arm negative bilaterally.  Nontender in the bicipital groove bilaterally.  Yergason's negative bilaterally.                       C-spine:  Forward flexion/backward flexion 70/70 degrees increased symptoms with backward flexion.  Right/left rotation 70/70 degrees, increased symptoms with right rotation.  Right/left side bending 65/70 degrees increased symptoms with right side bending.  Spurling's positive.  Radiography:  Personally reviewed x-rays of the right forearm completed on 04/29/2022 showed arthritic changes of the elbow and wrist no fracture.      Assessment:       Impression:     1. Radicular pain of right upper extremity          Plan:       1.  Discussed physical examination and radiographic findings with the patient. Amanda understands that she has radicular issues affecting her right upper extremity.  Treatment  alternatives and outcomes were discussed with the patient she understands she could be treated conservatively with observation, activity modification, NSAIDs, bracing, physical therapy, injections, or she could be treated surgically.  Discussed in detail with the patient that this office does not treat spine issues so she will need to be seen by spine surgeon.  2. Referred to Spine surgery.  3. Medrol Dosepak, use as directed, dispense 1 pack, refill 0.  A prescription was given to the patient to have filled.  4. Continue with our ready prescribed NSAIDs the patient should check with her PCM whether she should continue taking them were not or they should converted to a different NSAID.  5. Follow up p.r.n..

## 2022-05-04 ENCOUNTER — OFFICE VISIT (OUTPATIENT)
Dept: ORTHOPEDICS | Facility: CLINIC | Age: 57
End: 2022-05-04
Payer: COMMERCIAL

## 2022-05-04 ENCOUNTER — HOSPITAL ENCOUNTER (OUTPATIENT)
Dept: RADIOLOGY | Facility: HOSPITAL | Age: 57
Discharge: HOME | End: 2022-05-04
Attending: ORTHOPAEDIC SURGERY
Payer: COMMERCIAL

## 2022-05-04 VITALS — WEIGHT: 127.19 LBS | HEIGHT: 63 IN | BODY MASS INDEX: 22.54 KG/M2

## 2022-05-04 DIAGNOSIS — M54.12 CERVICAL RADICULOPATHY: Primary | ICD-10-CM

## 2022-05-04 DIAGNOSIS — M50.30 DDD (DEGENERATIVE DISC DISEASE), CERVICAL: ICD-10-CM

## 2022-05-04 PROCEDURE — 4010F ACE/ARB THERAPY RXD/TAKEN: CPT | Mod: CPTII,S$GLB,, | Performed by: ORTHOPAEDIC SURGERY

## 2022-05-04 PROCEDURE — 3008F PR BODY MASS INDEX (BMI) DOCUMENTED: ICD-10-PCS | Mod: CPTII,S$GLB,, | Performed by: ORTHOPAEDIC SURGERY

## 2022-05-04 PROCEDURE — 99214 PR OFFICE/OUTPT VISIT, EST, LEVL IV, 30-39 MIN: ICD-10-PCS | Mod: S$GLB,,, | Performed by: ORTHOPAEDIC SURGERY

## 2022-05-04 PROCEDURE — 3008F BODY MASS INDEX DOCD: CPT | Mod: CPTII,S$GLB,, | Performed by: ORTHOPAEDIC SURGERY

## 2022-05-04 PROCEDURE — 99214 OFFICE O/P EST MOD 30 MIN: CPT | Mod: S$GLB,,, | Performed by: ORTHOPAEDIC SURGERY

## 2022-05-04 PROCEDURE — 1159F MED LIST DOCD IN RCRD: CPT | Mod: CPTII,S$GLB,, | Performed by: ORTHOPAEDIC SURGERY

## 2022-05-04 PROCEDURE — 72050 XR CERVICAL SPINE AP LAT WITH FLEX EXTEN: ICD-10-PCS | Mod: 26,,, | Performed by: RADIOLOGY

## 2022-05-04 PROCEDURE — 1159F PR MEDICATION LIST DOCUMENTED IN MEDICAL RECORD: ICD-10-PCS | Mod: CPTII,S$GLB,, | Performed by: ORTHOPAEDIC SURGERY

## 2022-05-04 PROCEDURE — 4010F PR ACE/ARB THEARPY RXD/TAKEN: ICD-10-PCS | Mod: CPTII,S$GLB,, | Performed by: ORTHOPAEDIC SURGERY

## 2022-05-04 PROCEDURE — 99999 PR PBB SHADOW E&M-EST. PATIENT-LVL III: ICD-10-PCS | Mod: PBBFAC,,, | Performed by: ORTHOPAEDIC SURGERY

## 2022-05-04 PROCEDURE — 72050 X-RAY EXAM NECK SPINE 4/5VWS: CPT | Mod: TC

## 2022-05-04 PROCEDURE — 99999 PR PBB SHADOW E&M-EST. PATIENT-LVL III: CPT | Mod: PBBFAC,,, | Performed by: ORTHOPAEDIC SURGERY

## 2022-05-04 PROCEDURE — 72050 X-RAY EXAM NECK SPINE 4/5VWS: CPT | Mod: 26,,, | Performed by: RADIOLOGY

## 2022-05-09 NOTE — PROGRESS NOTES
DATE: 5/9/2022  PATIENT: Amanda Wood    Attending Physician: Wilton Pandya M.D.    CHIEF COMPLAINT:  Evaluate cervical spine    HISTORY:  Amanda Wood is a 56 y.o. female right-hand-dominant who fell into a door casing on April 2, 2022. Immediately she had right arm pain, and subsequently developed right radial hand and arm paresthesias.  She describes this as burning and pain.  Recently she has developed similar symptoms, although less severe, in her left upper extremity.  She has also developed myelopathic symptoms including difficulty with buttons coins and keys.  She was prescribed an oral steroid pack but did not take it.  She denies balance problems or bowel or bladder incontinence.  She has taken diclofenac.      Denies perineal paresthesias, bowel/bladder dysfunction.    PAST MEDICAL/SURGICAL HISTORY:  Past Medical History:   Diagnosis Date    Hashimoto's thyroiditis     Hay fever     Hypoglycemia     Thyroid disease      Past Surgical History:   Procedure Laterality Date    CARPAL TUNNEL RELEASE Left 12/10/2019    Procedure: RELEASE, CARPAL TUNNEL;  Surgeon: Kiet Simmons DO;  Location: [facility] OR;  Service: Orthopedics;  Laterality: Left;  Endoscopic VS Open Carpal Tunnel Release  Equipment: [employer] Endoscopic Carpal Tunnel Release System; Scope  Vendor/Rep: [employer]    ENDOSCOPIC CARPAL TUNNEL RELEASE Right 10/15/2019    Procedure: RELEASE, CARPAL TUNNEL, ENDOSCOPIC;  Surgeon: Kiet Simmons DO;  Location: [facility] OR;  Service: Orthopedics;  Laterality: Right;  Arthroscopic VS Open Carpal Tunnel Release  Equipment: [employer] Endoscopic Carpal Tunnel Release System  Vendor/Rep: [employer]    ENDOSCOPIC CARPAL TUNNEL RELEASE Left 12/10/2019    Procedure: RELEASE, CARPAL TUNNEL, ENDOSCOPIC;  Surgeon: Kiet Simmons DO;  Location: [facility] OR;  Service: Orthopedics;  Laterality: Left;  Endoscopic VS Open Carpal Tunnel Release  Equipment: [employer] Endoscopic Carpal Tunnel Release  "System; Scope  Vendor/Rep: [employer]    LAPAROSCOPIC CHOLECYSTECTOMY      MOLE REMOVAL      PATELLA SURGERY Left     THYROIDECTOMY Bilateral     TONSILLECTOMY      ULNAR TUNNEL RELEASE Right 10/15/2019    Procedure: RELEASE, CUBITAL TUNNEL;  Surgeon: Kiet Simmons DO;  Location: [facility] OR;  Service: Orthopedics;  Laterality: Right;  Equipment: [employer]ire Endoscopic Carpal Tunnel Release System  Vendor/Rep: [employer]    WRIST RECONSTRUCTION Left        Current Medications:   Current Outpatient Medications:     diclofenac (VOLTAREN) 50 MG EC tablet, Take 50 mg by mouth 2 (two) times daily., Disp: , Rfl:     levothyroxine (SYNTHROID) 125 MCG tablet, Take 125 mcg by mouth before breakfast., Disp: , Rfl:     levothyroxine (SYNTHROID) 137 MCG Tab tablet, TAKE 1 TABLET BY MOUTH ONCE DAILY, Disp: , Rfl:     lisinopriL 10 MG tablet, Take 10 mg by mouth once daily., Disp: , Rfl:     diazePAM (VALIUM) 5 MG tablet, Take 1 tablet (5 mg total) by mouth every 8 (eight) hours as needed (muscle spasm)., Disp: 6 tablet, Rfl: 0    HYDROcodone-acetaminophen (NORCO) 7.5-325 mg per tablet, Take 1 tablet by mouth every 8 (eight) hours as needed for Pain., Disp: 10 tablet, Rfl: 0    ondansetron (ZOFRAN-ODT) 4 MG TbDL, Take 1 tablet (4 mg total) by mouth every 6 (six) hours as needed (Nausea)., Disp: 12 tablet, Rfl: 0    Social History:   Social History     Socioeconomic History    Marital status:    Tobacco Use    Smoking status: Current Every Day Smoker     Packs/day: 1.00     Years: 40.00     Pack years: 40.00     Types: Cigarettes    Smokeless tobacco: Never Used   Substance and Sexual Activity    Alcohol use: Yes     Comment: most days of the week-beer/wine    Drug use: Never    Sexual activity: Not Currently        EXAM:  Ht 5' 3" (1.6 m)   Wt 57.7 kg (127 lb 3.3 oz)   BMI 22.53 kg/m²     Gait: Normal station and gait, no difficulty with toe or heel walk.   Skin: Cervical skin negative for rashes, " lesions, hairy patches and surgical scars.  She does have a transverse cervical scar consistent with prior thyroidectomy.  Range of motion: Cervical range of motion is acceptable. There is minimal tenderness to palpation.  Spinal Balance: Global saggital and coronal spinal balance acceptable, no significant for scoliosis and kyphosis.  Musculoskeletal: No pain with the range of motion of the bilateral shoulders and elbows. Normal bulk and contour of the bilateral hands.  Neurological: Normal strength and tone in all major motor groups in the bilateral upper and lower extremities. Normal sensation to light touch in the C5-T1 and L2-S1 dermatomes bilaterally.  Deep tendon reflexes symmetric 1+ in the bilateral upper and lower extremities.  Negative Inverted Radial Reflex and Griggs's bilaterally. Negative Babinski bilaterally.     IMAGING:   Today I personally reviewed AP, Lat and Flex/Ex  upright C-spine films that demonstrate surgical clips, the consistent with prior thyroidectomy.  There is global cervical kyphosis.  There is notable C5-7 spondylosis.      Body mass index is 22.53 kg/m².  No results found for: HGBA1C    ASSESSMENT/PLAN:  Cervical radiculopathy  -     MRI Cervical Spine Without Contrast; Future; Expected date: 05/04/2022      No follow-ups on file.    Given symptoms of myeloradiculopathy a MRI of the cervical spine is indicated.  I will see her back for results

## 2022-05-17 ENCOUNTER — TELEPHONE (OUTPATIENT)
Dept: ORTHOPEDICS | Facility: CLINIC | Age: 57
End: 2022-05-17
Payer: COMMERCIAL

## 2022-06-06 ENCOUNTER — HOSPITAL ENCOUNTER (OUTPATIENT)
Dept: RADIOLOGY | Facility: HOSPITAL | Age: 57
Discharge: HOME | End: 2022-06-06
Attending: ORTHOPAEDIC SURGERY
Payer: COMMERCIAL

## 2022-06-06 DIAGNOSIS — M54.12 CERVICAL RADICULOPATHY: ICD-10-CM

## 2022-06-06 PROCEDURE — 72141 MRI NECK SPINE W/O DYE: CPT | Mod: 26,,, | Performed by: RADIOLOGY

## 2022-06-06 PROCEDURE — 72141 MRI CERVICAL SPINE WITHOUT CONTRAST: ICD-10-PCS | Mod: 26,,, | Performed by: RADIOLOGY

## 2022-06-06 PROCEDURE — 72141 MRI NECK SPINE W/O DYE: CPT | Mod: TC

## 2022-06-23 ENCOUNTER — PATIENT MESSAGE (OUTPATIENT)
Dept: ORTHOPEDICS | Facility: CLINIC | Age: 57
End: 2022-06-23
Payer: COMMERCIAL

## 2022-06-27 NOTE — TELEPHONE ENCOUNTER
Called patient to ask how she is doing after surgery with Dr. Simmons and to verify her post op appointment. Patient voiced that she is doing well. Patient voiced that she has more nausea then her last procedure but she is not nauseous now. Patient voiced that she is not having any problems or discomfort now. Patient has no questions at this time. Post op appointment verified. Encouraged patient to call office if she has any questions or concerns.   
I will STOP taking the medications listed below when I get home from the hospital:  None

## (undated) DEVICE — BLADE #15 STERILE CARBON

## (undated) DEVICE — SUT 3-0 VICRYL / SH (J416)

## (undated) DEVICE — SPLINT PLASTER FS 5IN X 30IN

## (undated) DEVICE — NDL ELECTRODE E-Z CLEAN 2.75IN

## (undated) DEVICE — SLING ARM COMFT NAVY BLU MED

## (undated) DEVICE — SHEET DRAPE FAN-FOLDED 3/4

## (undated) DEVICE — GLOVE PI ULTRA TOUCH G SURGEON

## (undated) DEVICE — GLOVE SURG ULTRA TOUCH 9

## (undated) DEVICE — GLOVE SURG ULTRA TOUCH 7.5

## (undated) DEVICE — DRESSING N ADH OIL EMUL 3X3

## (undated) DEVICE — SEE MEDLINE ITEM 146270

## (undated) DEVICE — CLOSURE SKIN STERI STRIP 1/2X4

## (undated) DEVICE — PADDING CAST 4IN SPECIALIST

## (undated) DEVICE — GLOVE SURG ULTRA TOUCH 8.5

## (undated) DEVICE — SEE MEDLINE ITEM 152522

## (undated) DEVICE — ADHESIVE MASTISOL VIAL 48/BX

## (undated) DEVICE — PAD CAST SPECIALIST STRL 4

## (undated) DEVICE — CANISTER SUCTION 3000CC

## (undated) DEVICE — SEE MEDLINE ITEM 146298

## (undated) DEVICE — SPLINT PLASTER FAST SET 5X30IN

## (undated) DEVICE — SUT 4-0 VICRYL / SH

## (undated) DEVICE — SUT ETHILON 3-0 FS-1 30

## (undated) DEVICE — DRAPE STERI INSTRUMENT 1018

## (undated) DEVICE — ELECTRODE REM PLYHSV RETURN 9

## (undated) DEVICE — GLOVE SURG ULTRA TOUCH 7

## (undated) DEVICE — GAUZE SPONGE 4X4 12PLY

## (undated) DEVICE — GLOVE BIOGEL ORTHOPEDIC 6.5

## (undated) DEVICE — DRESSING TRANS 4X4 TEGADERM

## (undated) DEVICE — SEE MEDLINE ITEM 156964

## (undated) DEVICE — DRAPE STERI U-SHAPED 47X51IN

## (undated) DEVICE — SOL 9P NACL IRR PIC IL

## (undated) DEVICE — TOURNIQUET SB QC SP 18X4IN

## (undated) DEVICE — SUT CTD VICRYL 4-0 P-3 18IN

## (undated) DEVICE — SPONGE/BRUSH SCRUB SURG PCMX

## (undated) DEVICE — SEE MEDLINE ITEM 146308

## (undated) DEVICE — BLADE CARPAL TUNNEL ENDOSCOPIC

## (undated) DEVICE — GLOVE SURGEONS ULTRA TOUCH 6.5

## (undated) DEVICE — BANDAGE ELASTIC 3IN ACE NS

## (undated) DEVICE — INSTRUMENT CTRS BLADE ASSEMBLY

## (undated) DEVICE — SEE MEDLINE ITEM 157116

## (undated) DEVICE — SUT ETHILON 4-0 PS2 18 BLK

## (undated) DEVICE — SLEEVE SCD EXPRESS CALF MEDIUM